# Patient Record
Sex: FEMALE | Race: WHITE | NOT HISPANIC OR LATINO | Employment: FULL TIME | URBAN - METROPOLITAN AREA
[De-identification: names, ages, dates, MRNs, and addresses within clinical notes are randomized per-mention and may not be internally consistent; named-entity substitution may affect disease eponyms.]

---

## 2017-10-30 DIAGNOSIS — Z30.41 ENCOUNTER FOR SURVEILLANCE OF CONTRACEPTIVE PILLS: ICD-10-CM

## 2017-10-30 RX ORDER — NORGESTIMATE AND ETHINYL ESTRADIOL 7DAYSX3 28
1 KIT ORAL DAILY
Qty: 84 TABLET | Refills: 0 | Status: SHIPPED | OUTPATIENT
Start: 2017-10-30 | End: 2017-11-20

## 2017-10-30 NOTE — TELEPHONE ENCOUNTER
Fax received from pharmacy: [facility]/PHARMACY #[id number] - [place] - [address] [facility]    norgestim-eth estrad triphasic (ORTHO TRI-CYCLEN, 28,) 0.18/0.215/0.25 MG-35 MCG per tablet        Last Written Prescription Date: 11/16/17  Last Fill Quantity: 84, # refills: 3  Last Office Visit with G, P or [facility] prescribing provider: 11/16/16 (Annual)  Next 5 appointments (look out 90 days)     Nov 20, 2017  2:00 PM CST   PHYSICAL with Kamaljit Bailey MD   [facility] ([facility])    [address] [address] [phone]                   BP Readings from Last 3 Encounters:   11/16/16 (!) 138/94   11/10/15 130/80     Date of last Breast Exam: 11/16/16

## 2017-11-20 ENCOUNTER — OFFICE VISIT (OUTPATIENT)
Dept: OBGYN | Facility: CLINIC | Age: 47
End: 2017-11-20
Payer: COMMERCIAL

## 2017-11-20 ENCOUNTER — RADIANT APPOINTMENT (OUTPATIENT)
Dept: MAMMOGRAPHY | Facility: CLINIC | Age: 47
End: 2017-11-20
Payer: COMMERCIAL

## 2017-11-20 VITALS
HEART RATE: 78 BPM | SYSTOLIC BLOOD PRESSURE: 118 MMHG | DIASTOLIC BLOOD PRESSURE: 72 MMHG | HEIGHT: 66 IN | BODY MASS INDEX: 34.33 KG/M2 | WEIGHT: 213.6 LBS

## 2017-11-20 DIAGNOSIS — Z12.31 VISIT FOR SCREENING MAMMOGRAM: ICD-10-CM

## 2017-11-20 DIAGNOSIS — R63.5 WEIGHT GAIN: ICD-10-CM

## 2017-11-20 DIAGNOSIS — Z01.419 ENCOUNTER FOR GYNECOLOGICAL EXAMINATION WITHOUT ABNORMAL FINDING: Primary | ICD-10-CM

## 2017-11-20 DIAGNOSIS — Z30.41 ENCOUNTER FOR SURVEILLANCE OF CONTRACEPTIVE PILLS: ICD-10-CM

## 2017-11-20 PROCEDURE — G0145 SCR C/V CYTO,THINLAYER,RESCR: HCPCS | Performed by: OBSTETRICS & GYNECOLOGY

## 2017-11-20 PROCEDURE — 99396 PREV VISIT EST AGE 40-64: CPT | Performed by: OBSTETRICS & GYNECOLOGY

## 2017-11-20 PROCEDURE — 84443 ASSAY THYROID STIM HORMONE: CPT | Performed by: OBSTETRICS & GYNECOLOGY

## 2017-11-20 PROCEDURE — 87624 HPV HI-RISK TYP POOLED RSLT: CPT | Performed by: OBSTETRICS & GYNECOLOGY

## 2017-11-20 PROCEDURE — G0202 SCR MAMMO BI INCL CAD: HCPCS | Mod: TC

## 2017-11-20 PROCEDURE — 36415 COLL VENOUS BLD VENIPUNCTURE: CPT | Performed by: OBSTETRICS & GYNECOLOGY

## 2017-11-20 RX ORDER — FLUTICASONE PROPIONATE 50 MCG
SPRAY, SUSPENSION (ML) NASAL
Refills: 11 | COMMUNITY
Start: 2017-06-29

## 2017-11-20 RX ORDER — NORGESTIMATE AND ETHINYL ESTRADIOL 7DAYSX3 28
1 KIT ORAL DAILY
Qty: 84 TABLET | Refills: 4 | Status: SHIPPED | OUTPATIENT
Start: 2017-11-20 | End: 2018-11-21

## 2017-11-20 ASSESSMENT — ANXIETY QUESTIONNAIRES
1. FEELING NERVOUS, ANXIOUS, OR ON EDGE: NOT AT ALL
6. BECOMING EASILY ANNOYED OR IRRITABLE: SEVERAL DAYS
5. BEING SO RESTLESS THAT IT IS HARD TO SIT STILL: NOT AT ALL
2. NOT BEING ABLE TO STOP OR CONTROL WORRYING: NOT AT ALL
IF YOU CHECKED OFF ANY PROBLEMS ON THIS QUESTIONNAIRE, HOW DIFFICULT HAVE THESE PROBLEMS MADE IT FOR YOU TO DO YOUR WORK, TAKE CARE OF THINGS AT HOME, OR GET ALONG WITH OTHER PEOPLE: NOT DIFFICULT AT ALL
GAD7 TOTAL SCORE: 1
3. WORRYING TOO MUCH ABOUT DIFFERENT THINGS: NOT AT ALL
7. FEELING AFRAID AS IF SOMETHING AWFUL MIGHT HAPPEN: NOT AT ALL

## 2017-11-20 ASSESSMENT — PATIENT HEALTH QUESTIONNAIRE - PHQ9
5. POOR APPETITE OR OVEREATING: NOT AT ALL
SUM OF ALL RESPONSES TO PHQ QUESTIONS 1-9: 1

## 2017-11-20 NOTE — MR AVS SNAPSHOT
"              After Visit Summary   2017    Kimberley Carrillo    MRN: [id number]           Patient Information     Date Of Birth          1970        Visit Information        Provider Department      2017 2:00 PM Kamaljit Bailey MD [facility]        Today's Diagnoses     Encounter for gynecological examination without abnormal finding    -  1    Encounter for surveillance of contraceptive pills        Weight gain           Follow-ups after your visit        Who to contact     If you have questions or need follow up information about today's clinic visit or your schedule please contact [facility] directly at [phone].  Normal or non-critical lab and imaging results will be communicated to you by MyChart, letter or phone within 4 business days after the clinic has received the results. If you do not hear from us within 7 days, please contact the clinic through [employer] or phone. If you have a critical or abnormal lab result, we will notify you by phone as soon as possible.  Submit refill requests through [employer] or call your pharmacy and they will forward the refill request to us. Please allow 3 business days for your refill to be completed.          Additional Information About Your Visit        MyChart Information     [employer] lets you send messages to your doctor, view your test results, renew your prescriptions, schedule appointments and more. To sign up, go to [email or web address] . Click on \"Log in\" on the left side of the screen, which will take you to the Welcome page. Then click on \"Sign up Now\" on the right side of the page.     You will be asked to enter the access code listed below, as well as some personal information. Please follow the directions to create your username and password.     Your access code is: [email or web address]  Expires: 2018  2:42 PM     Your access code will  in 90 days. If you need help or a new code, please call your " "[facility] or [phone].        Care EveryWhere ID     This is your Care EveryWhere ID. This could be used by other organizations to access your [place] medical records  [id number]        Your Vitals Were     Pulse Height Last Period BMI (Body Mass Index)          78 5' 6.25\" (1.683 m) 11/02/2017 (Exact Date) 34.22 kg/m2         Blood Pressure from Last 3 Encounters:   11/20/17 118/72   11/16/16 (!) 138/94   11/10/15 130/80    Weight from Last 3 Encounters:   11/20/17 213 lb 9.6 oz (96.9 kg)   11/16/16 207 lb (93.9 kg)   11/10/15 218 lb (98.9 kg)              We Performed the Following     HPV High Risk Types DNA Cervical     Pap imaged thin layer screen with HPV - recommended age 30 - 65     TSH with free T4 reflex          Where to get your medicines      These medications were sent to [facility] [address] [place] [address] [place] [address] [facility] [address] [employer])  [address] , [facility] [address]     Phone:  [phone]     norgestim-eth estrad triphasic 0.18/0.215/0.25 MG-35 MCG per tablet          Primary Care Provider    None Specified       No primary provider on file.        Equal Access to Services     ARMIDA ROSAS : Hadii misha brand Soscarlett, waaxda luqadaha, qaybta kaalmadhavi trent, marissa jarrett. So [facility] [phone].    ATENCIÓN: Si habla español, tiene a washburn disposición servicios gratuitos de asistencia lingüística. Llame al [phone].    We comply with applicable federal civil rights laws and [place] laws. We do not discriminate on the basis of race, color, national origin, age, disability, sex, sexual orientation, or gender identity.            Thank you!     Thank you for choosing [facility]  for your care. Our goal is always to provide you with excellent care. Hearing back from our patients is one way we can continue to improve our services. Please take a few minutes to complete the written survey that " you may receive in the mail after your visit with us. Thank you!             Your Updated Medication List - Protect others around you: Learn how to safely use, store and throw away your medicines at www.disposemymeds.org.          This list is accurate as of: 11/20/17  2:42 PM.  Always use your most recent med list.                   Brand Name Dispense Instructions for use Diagnosis    albuterol 108 (90 BASE) MCG/ACT Inhaler    PROAIR HFA/PROVENTIL HFA/VENTOLIN HFA     Inhale 2 puffs into the lungs every 6 hours        betamethasone dipropionate 0.05 % cream    DIPROSONE          fluticasone 50 MCG/ACT spray    FLONASE     PLACE 2 SPRAYS INTO BOTH NOSTRILS DAILY.        norgestim-eth estrad triphasic 0.18/0.215/0.25 MG-35 MCG per tablet    ORTHO TRI-CYCLEN (28)    84 tablet    Take 1 tablet by mouth daily    Encounter for surveillance of contraceptive pills       OMEPRAZOLE PO      Take 10 mg by mouth daily

## 2017-11-20 NOTE — PROGRESS NOTES
Kimberley is a 47 year old  female who presents for annual exam.     Besides routine health maintenance,  she would like to discuss having hormone testing as well as checking her thyroid.    HPI: The patient is seen at this time for annual exam. She is very concerned about not being able to lose weight in light of exercising and dieting. She is concerned about the role of her birth control pills although she still needs contraception.  No primary care provider on file..        GYNECOLOGIC HISTORY:    Patient's last menstrual period was 2017 (exact date).  Her current contraception method is: oral contraceptives.  She  reports that she has never smoked. She has never used smokeless tobacco.      Patient is sexually active.  STD testing offered?  Declined  Last PHQ-9 score on record =   PHQ-9 SCORE 2017   Total Score 1     Last GAD7 score on record =   DEBBIE-7 SCORE 2017   Total Score 1     Alcohol Score = 3    HEALTH MAINTENANCE:  Cholesterol: (No results found for: CHOL -patient has biometric screen through her work  Last Mammo: 16, Result: normal, Next Mammo: today   Pap: 16 neg  Colonoscopy: NA, not due until age 52  Dexa: Never    Health maintenance updated:  yes    HISTORY:  Obstetric History       T1      L2     SAB2   TAB0   Ectopic0   Multiple0   Live Births2       # Outcome Date GA Lbr Carlos/2nd Weight Sex Delivery Anes PTL Lv   4   36w0d   M -SEC   JOEL   3 SAB            2 Term     M -SEC   JOEL   1 SAB                   Patient Active Problem List   Diagnosis     Non morbid obesity     Past Surgical History:   Procedure Laterality Date     breast lift       C/SECTION, CLASSICAL        Social History   Substance Use Topics     Smoking status: Never Smoker     Smokeless tobacco: Never Used     Alcohol use 0.0 oz/week     0 Standard drinks or equivalent per week      Comment: .occ      Problem (# of Occurrences) Relation (Name,Age of Onset)  "   CEREBROVASCULAR DISEASE (1) Paternal Grandfather    Hyperlipidemia (2) Mother, Father            Current Outpatient Prescriptions   Medication Sig     norgestim-eth estrad triphasic (ORTHO TRI-CYCLEN, 28,) 0.18/0.215/0.25 MG-35 MCG per tablet Take 1 tablet by mouth daily     OMEPRAZOLE PO Take 10 mg by mouth daily     albuterol (PROAIR HFA, PROVENTIL HFA, VENTOLIN HFA) 108 (90 BASE) MCG/ACT inhaler Inhale 2 puffs into the lungs every 6 hours     betamethasone dipropionate (DIPROSONE) 0.05 % cream      fluticasone (FLONASE) 50 MCG/ACT spray PLACE 2 SPRAYS INTO BOTH NOSTRILS DAILY.     No current facility-administered medications for this visit.      Allergies   Allergen Reactions     Erythromycin Nausea       Past medical, surgical, social and family histories were reviewed and updated in EPIC.    ROS:   12 point review of systems negative other than symptoms noted below.  Constitutional: Fatigue  Musculoskeletal: Height Loss  Endocrine: Cold Intolerance and Decreased Libido  Psychiatric: Joshua    EXAM:  /72  Pulse 78  Ht 5' 6.25\" (1.683 m)  Wt 213 lb 9.6 oz (96.9 kg)  LMP 11/02/2017 (Exact Date)  BMI 34.22 kg/m2   BMI: Body mass index is 34.22 kg/(m^2).    PHYSICAL EXAM:  Constitutional:  Appearance: Well nourished, well developed, alert, in no acute distress  Neck:  Lymph Nodes:  No lymphadenopathy present    Thyroid:  Gland size normal, nontender, no nodules or masses present  on palpation  Chest:  Respiratory Effort:  Breathing unlabored  Cardiovascular:    Heart: Auscultation:  Regular rate, normal rhythm, no murmurs present  Breasts: Inspection of Breasts:  No lymphadenopathy present., Palpation of Breasts and Axillae:  No masses present on palpation, no breast tenderness., Axillary Lymph Nodes:  No lymphadenopathy present. and No nodularity, asymmetry or nipple discharge bilaterally.  Gastrointestinal:   Abdominal Examination:  Abdomen nontender to palpation, tone normal without rigidity or " guarding, no masses present, umbilicus without lesions   Liver and Spleen:  No hepatomegaly present, liver nontender to palpation    Hernias:  No hernias present  Lymphatic: Lymph Nodes:  No other lymphadenopathy present  Skin:  General Inspection:  No rashes present, no lesions present, no areas of  discoloration    Genitalia and Groin:  No rashes present, no lesions present, no areas of  discoloration, no masses present  Neurologic/Psychiatric:    Mental Status:  Oriented X3     Pelvic Exam:  External Genitalia:     Normal appearance for age, no discharge present, no tenderness present, no inflammatory lesions present, color normal  Vagina:     Normal vaginal vault without central or paravaginal defects, no discharge present, no inflammatory lesions present, no masses present  Bladder:     Nontender to palpation  Urethra:   Urethral Body:  Urethra palpation normal, urethra structural support normal   Urethral Meatus:  No erythema or lesions present  Cervix:     Appearance healthy, no lesions present, nontender to palpation, no bleeding present  Uterus:     Uterus: firm, normal sized and nontender, midplane in position.   Adnexa:     No adnexal tenderness present, no adnexal masses present  Perineum:     Perineum within normal limits, no evidence of trauma, no rashes or skin lesions present  Anus:     Anus within normal limits, no hemorrhoids present  Inguinal Lymph Nodes:     No lymphadenopathy present  Pubic Hair:     Normal pubic hair distribution for age  Genitalia and Groin:     No rashes present, no lesions present, no areas of discoloration, no masses present      COUNSELING:   Reviewed preventive health counseling, as reflected in patient instructions       Regular exercise       Healthy diet/nutrition    BMI: Body mass index is 34.22 kg/(m^2).  Weight management plan: Discussed healthy diet and exercise guidelines and patient will follow up in 12 months in clinic to re-evaluate.    ASSESSMENT:  47 year old  female with satisfactory annual exam.    ICD-10-CM    1. Encounter for gynecological examination without abnormal finding Z01.419 Pap imaged thin layer screen with HPV - recommended age 30 - 65     HPV High Risk Types DNA Cervical   2. Encounter for surveillance of contraceptive pills Z30.41        PLAN: The patient has a relatively negative exam and is struggling with her weight balance. We will check a TSH and T4 to rule out hypothyroidism. She wishes to stay on birth control pills at this time.      Kamaljit Bailey MD

## 2017-11-21 LAB — TSH SERPL DL<=0.005 MIU/L-ACNC: 1.74 MU/L (ref 0.4–4)

## 2017-11-21 ASSESSMENT — ANXIETY QUESTIONNAIRES: GAD7 TOTAL SCORE: 1

## 2017-11-22 LAB
COPATH REPORT: NORMAL
PAP: NORMAL

## 2017-11-27 LAB
FINAL DIAGNOSIS: NORMAL
HPV HR 12 DNA CVX QL NAA+PROBE: NEGATIVE
HPV16 DNA SPEC QL NAA+PROBE: NEGATIVE
HPV18 DNA SPEC QL NAA+PROBE: NEGATIVE
SPECIMEN DESCRIPTION: NORMAL

## 2018-01-20 DIAGNOSIS — Z30.41 ENCOUNTER FOR SURVEILLANCE OF CONTRACEPTIVE PILLS: ICD-10-CM

## 2018-01-22 RX ORDER — NORGESTIMATE AND ETHINYL ESTRADIOL 7DAYSX3 28
KIT ORAL
Qty: 84 TABLET | Refills: 0 | OUTPATIENT
Start: 2018-01-22

## 2018-01-22 NOTE — TELEPHONE ENCOUNTER
Tri-Estarylla 0.18-0.215/0.25 MG-35 MCG      Last Written Prescription Date:  11/20/17  Last Fill Quantity: 84 tabs,   # refills: 4  Last Office Visit with [facility] primary care provider:  11/20/17-Annual with Dr. Bailey  Future Office visit: None    Electronic refill request is coming from [facility] in [place] ([phone]) and the Rx at her annual on 11/20/17 was sent to [facility] in [place] ([phone]). Called [facility], spoke with Pharmacist (Joshua) and informed of the pharmacy name and phone number her Rx was sent to, Joshua voiced understanding.    Electronic Rx denied, closing encounter.

## 2018-11-21 ENCOUNTER — RADIANT APPOINTMENT (OUTPATIENT)
Dept: MAMMOGRAPHY | Facility: CLINIC | Age: 48
End: 2018-11-21
Payer: COMMERCIAL

## 2018-11-21 ENCOUNTER — OFFICE VISIT (OUTPATIENT)
Dept: OBGYN | Facility: CLINIC | Age: 48
End: 2018-11-21
Payer: COMMERCIAL

## 2018-11-21 VITALS
HEART RATE: 72 BPM | SYSTOLIC BLOOD PRESSURE: 112 MMHG | WEIGHT: 218 LBS | DIASTOLIC BLOOD PRESSURE: 70 MMHG | BODY MASS INDEX: 35.03 KG/M2 | HEIGHT: 66 IN

## 2018-11-21 DIAGNOSIS — Z30.41 ENCOUNTER FOR SURVEILLANCE OF CONTRACEPTIVE PILLS: ICD-10-CM

## 2018-11-21 DIAGNOSIS — Z12.31 VISIT FOR SCREENING MAMMOGRAM: ICD-10-CM

## 2018-11-21 DIAGNOSIS — Z01.419 ENCOUNTER FOR GYNECOLOGICAL EXAMINATION WITHOUT ABNORMAL FINDING: Primary | ICD-10-CM

## 2018-11-21 PROCEDURE — 99396 PREV VISIT EST AGE 40-64: CPT | Performed by: OBSTETRICS & GYNECOLOGY

## 2018-11-21 PROCEDURE — 87624 HPV HI-RISK TYP POOLED RSLT: CPT | Performed by: OBSTETRICS & GYNECOLOGY

## 2018-11-21 PROCEDURE — G0145 SCR C/V CYTO,THINLAYER,RESCR: HCPCS | Performed by: OBSTETRICS & GYNECOLOGY

## 2018-11-21 PROCEDURE — 77067 SCR MAMMO BI INCL CAD: CPT | Mod: TC

## 2018-11-21 RX ORDER — NORGESTIMATE AND ETHINYL ESTRADIOL 7DAYSX3 28
1 KIT ORAL DAILY
Qty: 84 TABLET | Refills: 4 | Status: SHIPPED | OUTPATIENT
Start: 2018-11-21 | End: 2019-11-24

## 2018-11-21 ASSESSMENT — ANXIETY QUESTIONNAIRES
3. WORRYING TOO MUCH ABOUT DIFFERENT THINGS: NOT AT ALL
GAD7 TOTAL SCORE: 0
IF YOU CHECKED OFF ANY PROBLEMS ON THIS QUESTIONNAIRE, HOW DIFFICULT HAVE THESE PROBLEMS MADE IT FOR YOU TO DO YOUR WORK, TAKE CARE OF THINGS AT HOME, OR GET ALONG WITH OTHER PEOPLE: NOT DIFFICULT AT ALL
7. FEELING AFRAID AS IF SOMETHING AWFUL MIGHT HAPPEN: NOT AT ALL
2. NOT BEING ABLE TO STOP OR CONTROL WORRYING: NOT AT ALL
5. BEING SO RESTLESS THAT IT IS HARD TO SIT STILL: NOT AT ALL
1. FEELING NERVOUS, ANXIOUS, OR ON EDGE: NOT AT ALL
6. BECOMING EASILY ANNOYED OR IRRITABLE: NOT AT ALL

## 2018-11-21 ASSESSMENT — PATIENT HEALTH QUESTIONNAIRE - PHQ9
SUM OF ALL RESPONSES TO PHQ QUESTIONS 1-9: 1
5. POOR APPETITE OR OVEREATING: NOT AT ALL

## 2018-11-21 NOTE — MR AVS SNAPSHOT
"              After Visit Summary   11/21/2018    Kimberley Carrillo    MRN: [id number]           Patient Information     Date Of Birth          1970        Visit Information        Provider Department      11/21/2018 3:15 PM Kamaljit Bailey MD [facility]        Today's Diagnoses     Encounter for gynecological examination without abnormal finding    -  1    Encounter for surveillance of contraceptive pills           Follow-ups after your visit        Who to contact     If you have questions or need follow up information about today's clinic visit or your schedule please contact [facility] directly at [phone].  Normal or non-critical lab and imaging results will be communicated to you by MyChart, letter or phone within 4 business days after the clinic has received the results. If you do not hear from us within 7 days, please contact the clinic through MyChart or phone. If you have a critical or abnormal lab result, we will notify you by phone as soon as possible.  Submit refill requests through [employer] or call your pharmacy and they will forward the refill request to us. Please allow 3 business days for your refill to be completed.          Additional Information About Your Visit        Care EveryWhere ID     This is your Care EveryWhere ID. This could be used by other organizations to access your [place] medical records  [id number]        Your Vitals Were     Pulse Height Last Period BMI (Body Mass Index)          72 5' 5.5\" (1.664 m) 10/30/2018 (Exact Date) 35.73 kg/m2         Blood Pressure from Last 3 Encounters:   11/21/18 112/70   11/20/17 118/72   11/16/16 (!) 138/94    Weight from Last 3 Encounters:   11/21/18 218 lb (98.9 kg)   11/20/17 213 lb 9.6 oz (96.9 kg)   11/16/16 207 lb (93.9 kg)              We Performed the Following     HPV High Risk Types DNA Cervical     Pap imaged thin layer screen with HPV - recommended age 30 - 65          Where to get " your medicines      These medications were sent to [id number] - [place], [place] - [address] [place] [address] AT [facility] OF  & [place] [facility]  [address] [place] [address], [place] [address]     Phone:  [phone]     norgestim-eth estrad triphasic 0.18/0.215/0.25 MG-35 MCG per tablet          Primary Care Provider Office Phone # Fax #    [facility] [phone]       [facility] [address] [facility] [address]  [facility] [address]        Equal Access to Services     ARMIDA ROSAS : Hadii misha Pimentel, wajulianne garay, qajamee kaalmada miley, marissa jarrett. So [facility] [phone].    ATENCIÓN: Si habla español, tiene a washburn disposición servicios gratuitos de asistencia lingüística. [facility] [phone].    We comply with applicable federal civil rights laws and [place] laws. We do not discriminate on the basis of race, color, national origin, age, disability, sex, sexual orientation, or gender identity.            Thank you!     Thank you for choosing [facility]  for your care. Our goal is always to provide you with excellent care. Hearing back from our patients is one way we can continue to improve our services. Please take a few minutes to complete the written survey that you may receive in the mail after your visit with us. Thank you!             Your Updated Medication List - Protect others around you: Learn how to safely use, store and throw away your medicines at www.disposemymeds.org.          This list is accurate as of 11/21/18  3:44 PM.  Always use your most recent med list.                   Brand Name Dispense Instructions for use Diagnosis    albuterol 108 (90 Base) MCG/ACT inhaler    PROAIR HFA/PROVENTIL HFA/VENTOLIN HFA     Inhale 2 puffs into the lungs every 6 hours        betamethasone dipropionate 0.05 % cream    DIPROSONE          fluticasone 50 MCG/ACT spray    FLONASE      PLACE 2 SPRAYS INTO BOTH NOSTRILS DAILY.        norgestim-eth estrad triphasic 0.18/0.215/0.25 MG-35 MCG per tablet    ORTHO TRI-CYCLEN (28)    84 tablet    Take 1 tablet by mouth daily    Encounter for surveillance of contraceptive pills       OMEPRAZOLE PO      Take 10 mg by mouth daily

## 2018-11-21 NOTE — LETTER
December 4, 2018    Kimberley Carrillo  [address] [address] [place] [id number]    Dear Kimberley,  We are happy to inform you that your PAP smear result from 11/21/18 is normal.  We are now able to do a follow up test on PAP smears. The DNA test is for HPV (Human Papilloma Virus). Cervical cancer is closely linked with certain types of HPV. Your results showed no evidence of high risk HPV.  Therefore we recommend you return in 3 years for your next pap smear.  You will still need to return to the clinic every year for an annual exam and other preventive tests.  If you have additional questions regarding this result, please call our registered nurse, Keke at [phone].  Sincerely,    Kamaljit Bailey MD/crystal

## 2018-11-21 NOTE — PROGRESS NOTES
Kimberley is a 48 year old  female who presents for annual exam.     Besides routine health maintenance, she has no other health concerns today .    HPI: The patient is seen at this time for annual exam.  She is still using oral contraception successfully.  Her cycles are short.  The patient's PCP is [facility].        GYNECOLOGIC HISTORY:    Patient's last menstrual period was 10/30/2018 (exact date).  Her current contraception method is: oral contraceptives.  She  reports that she has never smoked. She has never used smokeless tobacco.    Patient is sexually active.  STD testing offered?  Declined  Last PHQ-9 score on record =   PHQ-9 SCORE 2018   Total Score 1     Last GAD7 score on record =   DEBBIE-7 SCORE 2018   Total Score 0     Alcohol Score = 4    HEALTH MAINTENANCE:  Cholesterol: (No results found for: CHOL patient had labs done through her workplace   Last Mammo: 17, Result: normal, Next Mammo: today   Pap: 17 neg, HPV-  Colonoscopy: NA, due at age 52  Dexa: Never    Health maintenance updated:  yes    HISTORY:  Obstetric History       T1      L2     SAB2   TAB0   Ectopic0   Multiple0   Live Births2       # Outcome Date GA Lbr Carlos/2nd Weight Sex Delivery Anes PTL Lv   4   36w0d   M -SEC   JOEL   3 SAB            2 Term     M -SEC   JOEL   1 SAB                   Patient Active Problem List   Diagnosis     Non morbid obesity     Past Surgical History:   Procedure Laterality Date     breast lift       C/SECTION, CLASSICAL        Social History   Substance Use Topics     Smoking status: Never Smoker     Smokeless tobacco: Never Used     Alcohol use 0.0 oz/week     0 Standard drinks or equivalent per week      Comment: .occ      Problem (# of Occurrences) Relation (Name,Age of Onset)    Cerebrovascular Disease (1) Paternal Grandfather    Hyperlipidemia (2) Mother, Father            Current Outpatient Prescriptions  "  Medication Sig     albuterol (PROAIR HFA, PROVENTIL HFA, VENTOLIN HFA) 108 (90 BASE) MCG/ACT inhaler Inhale 2 puffs into the lungs every 6 hours     betamethasone dipropionate (DIPROSONE) 0.05 % cream      fluticasone (FLONASE) 50 MCG/ACT spray PLACE 2 SPRAYS INTO BOTH NOSTRILS DAILY.     norgestim-eth estrad triphasic (ORTHO TRI-CYCLEN, 28,) 0.18/0.215/0.25 MG-35 MCG per tablet Take 1 tablet by mouth daily     OMEPRAZOLE PO Take 10 mg by mouth daily     No current facility-administered medications for this visit.      Allergies   Allergen Reactions     Erythromycin Nausea       Past medical, surgical, social and family histories were reviewed and updated in EPIC.    ROS:   12 point review of systems negative other than symptoms noted below.  Musculoskeletal: Joint Pain    EXAM:  /70  Pulse 72  Ht 5' 5.5\" (1.664 m)  Wt 218 lb (98.9 kg)  LMP 10/30/2018 (Exact Date)  BMI 35.73 kg/m2   BMI: Body mass index is 35.73 kg/(m^2).    PHYSICAL EXAM:  Constitutional:  Appearance: Well nourished, well developed, alert, in no acute distress  Neck:  Lymph Nodes:  No lymphadenopathy present    Thyroid:  Gland size normal, nontender, no nodules or masses present  on palpation  Chest:  Respiratory Effort:  Breathing unlabored  Cardiovascular:    Heart: Auscultation:  Regular rate, normal rhythm, no murmurs present  Breasts: Inspection of Breasts:  No lymphadenopathy present., Palpation of Breasts and Axillae:  No masses present on palpation, no breast tenderness., Axillary Lymph Nodes:  No lymphadenopathy present. and No nodularity, asymmetry or nipple discharge bilaterally.  Gastrointestinal:   Abdominal Examination:  Abdomen nontender to palpation, tone normal without rigidity or guarding, no masses present, umbilicus without lesions   Liver and Spleen:  No hepatomegaly present, liver nontender to palpation    Hernias:  No hernias present  Lymphatic: Lymph Nodes:  No other lymphadenopathy present  Skin:  General " Inspection:  No rashes present, no lesions present, no areas of  discoloration    Genitalia and Groin:  No rashes present, no lesions present, no areas of  discoloration, no masses present  Neurologic/Psychiatric:    Mental Status:  Oriented X3     Pelvic Exam:  External Genitalia:     Normal appearance for age, no discharge present, no tenderness present, no inflammatory lesions present, color normal  Vagina:     Normal vaginal vault without central or paravaginal defects, no discharge present, no inflammatory lesions present, no masses present  Bladder:     Nontender to palpation  Urethra:   Urethral Body:  Urethra palpation normal, urethra structural support normal   Urethral Meatus:  No erythema or lesions present  Cervix:     Appearance healthy, no lesions present, nontender to palpation, no bleeding present  Uterus:     Uterus: firm, normal sized and nontender, midplane in position.   Adnexa:     No adnexal tenderness present, no adnexal masses present  Perineum:     Perineum within normal limits, no evidence of trauma, no rashes or skin lesions present  Anus:     Anus within normal limits, no hemorrhoids present  Inguinal Lymph Nodes:     No lymphadenopathy present  Pubic Hair:     Normal pubic hair distribution for age  Genitalia and Groin:     No rashes present, no lesions present, no areas of discoloration, no masses present      COUNSELING:   Reviewed preventive health counseling, as reflected in patient instructions       Regular exercise       Healthy diet/nutrition    BMI: Body mass index is 35.73 kg/(m^2).  Weight management plan: Discussed healthy diet and exercise guidelines and patient will follow up in 12 months in clinic to re-evaluate.    ASSESSMENT:  48 year old female with satisfactory annual exam.    ICD-10-CM    1. Encounter for gynecological examination without abnormal finding Z01.419 Pap imaged thin layer screen with HPV - recommended age 30 - 65     HPV High Risk Types DNA Cervical   2.  Encounter for surveillance of contraceptive pills Z30.41        PLAN: The patient is seen at this time for annual exam.  We will convey her mammogram and Pap results.  Her mother who has had ovarian cancer in the past as a new diagnosis of cervical cancer and is being treated at [facility] Matias Bailey MD

## 2018-11-22 ASSESSMENT — ANXIETY QUESTIONNAIRES: GAD7 TOTAL SCORE: 0

## 2018-11-28 LAB
COPATH REPORT: NORMAL
PAP: NORMAL

## 2018-11-29 LAB
FINAL DIAGNOSIS: NORMAL
HPV HR 12 DNA CVX QL NAA+PROBE: NEGATIVE
HPV16 DNA SPEC QL NAA+PROBE: NEGATIVE
HPV18 DNA SPEC QL NAA+PROBE: NEGATIVE
SPECIMEN DESCRIPTION: NORMAL
SPECIMEN SOURCE CVX/VAG CYTO: NORMAL

## 2018-12-22 DIAGNOSIS — Z30.41 ENCOUNTER FOR SURVEILLANCE OF CONTRACEPTIVE PILLS: ICD-10-CM

## 2018-12-24 RX ORDER — NORGESTIMATE-ETHINYL ESTRADIOL 7DAYSX3 28
TABLET ORAL
Qty: 84 TABLET | Refills: 0 | OUTPATIENT
Start: 2018-12-24

## 2018-12-24 NOTE — TELEPHONE ENCOUNTER
"Requested Prescriptions   Pending Prescriptions Disp Refills     TRINESSA, 28, 0.18/0.215/0.25 MG-35 MCG tablet [Pharmacy Med Name: TRINESSA TABLETS 28S] 84 tablet 0     Sig: TAKE 1 TABLET BY MOUTH EVERY DAY    Contraceptives Protocol Passed - 12/22/2018 10:10 AM       Passed - Patient is not a current smoker if age is 35 or older       Passed - Recent (12 mo) or future (30 days) visit within the authorizing provider's specialty    Patient had office visit in the last 12 months or has a visit in the next 30 days with authorizing provider or within the authorizing provider's specialty.  See \"Patient Info\" tab in inbasket, or \"Choose Columns\" in Meds & Orders section of the refill encounter.             Passed - No active pregnancy on record       Passed - No positive pregnancy test in past 12 months      Last Written Prescription Date:  11/21/18  Last Fill Quantity: 84,  # refills: 4   Last office visit: 11/21/2018 with prescribing provider:  Lynn   Future Office Visit:      Refill sent 11/21/18 for 84 with 4RF. Receipt confirmed by pharmacy    "

## 2018-12-28 DIAGNOSIS — Z30.41 ENCOUNTER FOR SURVEILLANCE OF CONTRACEPTIVE PILLS: ICD-10-CM

## 2018-12-28 RX ORDER — NORGESTIMATE-ETHINYL ESTRADIOL 7DAYSX3 28
TABLET ORAL
Qty: 84 TABLET | Refills: 0 | OUTPATIENT
Start: 2018-12-28

## 2018-12-28 NOTE — TELEPHONE ENCOUNTER
"Requested Prescriptions   Pending Prescriptions Disp Refills     TRINESSA, 28, 0.18/0.215/0.25 MG-35 MCG tablet [Pharmacy Med Name: TRINESSA TABLETS 28S] 84 tablet 0     Sig: TAKE 1 TABLET BY MOUTH EVERY DAY    Contraceptives Protocol Passed - 12/28/2018  8:40 AM       Passed - Patient is not a current smoker if age is 35 or older       Passed - Recent (12 mo) or future (30 days) visit within the authorizing provider's specialty    Patient had office visit in the last 12 months or has a visit in the next 30 days with authorizing provider or within the authorizing provider's specialty.  See \"Patient Info\" tab in inbasket, or \"Choose Columns\" in Meds & Orders section of the refill encounter.             Passed - No active pregnancy on record       Passed - No positive pregnancy test in past 12 months          Pt has available refills  Nury Mckeon RN on 12/28/2018 at 8:54 AM    "

## 2019-11-24 DIAGNOSIS — Z30.41 ENCOUNTER FOR SURVEILLANCE OF CONTRACEPTIVE PILLS: ICD-10-CM

## 2019-11-25 RX ORDER — NORGESTIMATE AND ETHINYL ESTRADIOL 7DAYSX3 28
KIT ORAL
Qty: 84 TABLET | Refills: 0 | Status: SHIPPED | OUTPATIENT
Start: 2019-11-25 | End: 2019-11-27

## 2019-11-25 NOTE — TELEPHONE ENCOUNTER
"Requested Prescriptions   Pending Prescriptions Disp Refills     TRI-SPRINTEC 0.18/0.215/0.25 MG-35 MCG tablet [Pharmacy Med Name: TRI-SPRINTEC TABLETS 28] 84 tablet 0     Sig: TAKE 1 TABLET BY MOUTH DAILY       Contraceptives Protocol Passed - 11/24/2019  3:11 AM        Passed - Patient is not a current smoker if age is 35 or older        Passed - Recent (12 mo) or future (30 days) visit within the authorizing provider's specialty     Patient has had an office visit with the authorizing provider or a provider within the authorizing providers department within the previous 12 mos or has a future within next 30 days. See \"Patient Info\" tab in inbasket, or \"Choose Columns\" in Meds & Orders section of the refill encounter.              Passed - Medication is active on med list        Passed - No active pregnancy on record        Passed - No positive pregnancy test in past 12 months        Next 5 appointments (look out 90 days)    Nov 27, 2019  9:45 AM CST  PHYSICAL with Kamaljit Bailey MD  [facility] ([facility]) [address] [place] [id number]  [phone]        Prescription approved per [facility] Refill Protocol.  Nury Mckeon RN on 11/25/2019 at 8:34 AM    "

## 2019-11-25 NOTE — PROGRESS NOTES
Kimberley is a 49 year old  female who presents for annual exam.     Besides routine health maintenance, she has no other health concerns today .    HPI: The patient is seen for her yearly exam.  She is still on birth control pills and having regular cycles.  She has some fatigue but no hot flashes or night sweats.  She has numerous questions about menopause and potential hormone replacement  The patient's PCP is  [facility].        GYNECOLOGIC HISTORY:    Patient's last menstrual period was 2019 (exact date).    Regular menses? yes  Menses every 28/30 days.  Length of menses: 4 days    Her current contraception method is: oral contraceptives.  She  reports that she has never smoked. She has never used smokeless tobacco.    Patient is sexually active.  STD testing offered?  Declined  Last PHQ-9 score on record =   PHQ-9 SCORE 2019   PHQ-9 Total Score 1     Last GAD7 score on record =   DEBBIE-7 SCORE 2019   Total Score 1     Alcohol Score = 3    HEALTH MAINTENANCE:  Cholesterol: 2016   Total= 225, Triglycerides=114, HDL=88, VSN=685  Last Mammo: One year ago, Result: Normal, Next Mammo: Today.  Pap:   Lab Results   Component Value Date    PAP NIL, HPV- 2018    PAP NIL 2017    PAP NIL 2016      Colonoscopy:  NA, Result: Not applicable, Next Colonoscopy: 3 years.  Dexa:  NA    Health maintenance updated:  yes    HISTORY:  OB History    Para Term  AB Living   4 2 1 1 2 2   SAB TAB Ectopic Multiple Live Births   2 0 0 0 2      # Outcome Date GA Lbr Carlos/2nd Weight Sex Delivery Anes PTL Lv   4   36w0d   M -SEC   JOEL   3 SAB            2 Term     M -SEC   JOEL   1 SAB                Patient Active Problem List   Diagnosis     Non morbid obesity     Past Surgical History:   Procedure Laterality Date     breast lift       C/SECTION, CLASSICAL        Social History     Tobacco Use     Smoking status: Never Smoker      "Smokeless tobacco: Never Used   Substance Use Topics     Alcohol use: Yes     Alcohol/week: 0.0 standard drinks     Comment: .occ      Problem (# of Occurrences) Relation (Name,Age of Onset)    Cerebrovascular Disease (1) Paternal Grandfather    Hyperlipidemia (2) Mother, Father            Current Outpatient Medications   Medication Sig     albuterol (PROAIR HFA, PROVENTIL HFA, VENTOLIN HFA) 108 (90 BASE) MCG/ACT inhaler Inhale 2 puffs into the lungs every 6 hours     betamethasone dipropionate (DIPROSONE) 0.05 % cream      fluticasone (FLONASE) 50 MCG/ACT spray PLACE 2 SPRAYS INTO BOTH NOSTRILS DAILY.     OMEPRAZOLE PO Take 10 mg by mouth daily     TRI-SPRINTEC 0.18/0.215/0.25 MG-35 MCG tablet TAKE 1 TABLET BY MOUTH DAILY     No current facility-administered medications for this visit.      Allergies   Allergen Reactions     Erythromycin Nausea       Past medical, surgical, social and family histories were reviewed and updated in EPIC.    ROS:   12 point review of systems negative other than symptoms noted below or in the HPI.  No urinary frequency or dysuria, bladder or kidney problems    EXAM:  /74   Pulse 72   Ht 1.689 m (5' 6.5\")   Wt 98.2 kg (216 lb 6.4 oz)   LMP 11/23/2019 (Exact Date)   Breastfeeding No   BMI 34.40 kg/m     BMI: Body mass index is 34.4 kg/m .    PHYSICAL EXAM:  Constitutional:   Appearance: Well nourished, well developed, alert, in no acute distress  Neck:  Lymph Nodes:  No lymphadenopathy present    Thyroid:  Gland size normal, nontender, no nodules or masses present  on palpation  Chest:  Respiratory Effort:  Breathing unlabored  Cardiovascular:    Heart: Auscultation:  Regular rate, normal rhythm, no murmurs present  Breasts: Inspection of Breasts:  No lymphadenopathy present., Palpation of Breasts and Axillae:  No masses present on palpation, no breast tenderness., Axillary Lymph Nodes:  No lymphadenopathy present. and No nodularity, asymmetry or nipple discharge " bilaterally.reduction scars excellent  Gastrointestinal:   Abdominal Examination:  Abdomen nontender to palpation, tone normal without rigidity or guarding, no masses present, umbilicus without lesions   Liver and Spleen:  No hepatomegaly present, liver nontender to palpation    Hernias:  No hernias present  Lymphatic: Lymph Nodes:  No other lymphadenopathy present  Skin:  General Inspection:  No rashes present, no lesions present, no areas of  discoloration  Neurologic:    Mental Status:  Oriented X3.  Normal strength and tone, sensory exam                grossly normal, mentation intact and speech normal.    Psychiatric:   Mentation appears normal and affect normal/bright.         Pelvic Exam:  External Genitalia:     Normal appearance for age, no discharge present, no tenderness present, no inflammatory lesions present, color normal  Vagina:     Normal vaginal vault without central or paravaginal defects, no discharge present, no inflammatory lesions present, no masses present  Bladder:     Nontender to palpation  Urethra:   Urethral Body:  Urethra palpation normal, urethra structural support normal   Urethral Meatus:  No erythema or lesions present  Cervix:     Appearance healthy, no lesions present, nontender to palpation, no bleeding present  Uterus:     Uterus: firm, normal sized and nontender, midplane in position.   Adnexa:     No adnexal tenderness present, no adnexal masses present  Perineum:     Perineum within normal limits, no evidence of trauma, no rashes or skin lesions present  Anus:     Anus within normal limits, no hemorrhoids present  Inguinal Lymph Nodes:     No lymphadenopathy present  Pubic Hair:     Normal pubic hair distribution for age  Genitalia and Groin:     No rashes present, no lesions present, no areas of discoloration, no masses present      COUNSELING:   Reviewed preventive health counseling, as reflected in patient instructions       Regular exercise       Healthy  diet/nutrition    BMI: Body mass index is 34.4 kg/m .  Weight management plan: Discussed healthy diet and exercise guidelines    ASSESSMENT:  49 year old female with satisfactory annual exam.    ICD-10-CM    1. Encounter for gynecological examination without abnormal finding Z01.419 Pap imaged thin layer screen with HPV - recommended age 30 - 65     HPV High Risk Types DNA Cervical   2. Encounter for surveillance of contraceptive pills Z30.41        PLAN: The patient is seen at this time for her annual exam.  She will remain on birth control pills.  She is not menopausal and we had a long discussion of the necessity of replacement therapy if she became highly symptomatic.      Kamaljit Bailey MD

## 2019-11-27 ENCOUNTER — OFFICE VISIT (OUTPATIENT)
Dept: OBGYN | Facility: CLINIC | Age: 49
End: 2019-11-27
Payer: COMMERCIAL

## 2019-11-27 ENCOUNTER — ANCILLARY PROCEDURE (OUTPATIENT)
Dept: MAMMOGRAPHY | Facility: CLINIC | Age: 49
End: 2019-11-27
Payer: COMMERCIAL

## 2019-11-27 VITALS
SYSTOLIC BLOOD PRESSURE: 110 MMHG | DIASTOLIC BLOOD PRESSURE: 74 MMHG | HEART RATE: 72 BPM | HEIGHT: 67 IN | BODY MASS INDEX: 33.97 KG/M2 | WEIGHT: 216.4 LBS

## 2019-11-27 DIAGNOSIS — Z12.31 VISIT FOR SCREENING MAMMOGRAM: ICD-10-CM

## 2019-11-27 DIAGNOSIS — Z30.41 ENCOUNTER FOR SURVEILLANCE OF CONTRACEPTIVE PILLS: ICD-10-CM

## 2019-11-27 DIAGNOSIS — Z01.419 ENCOUNTER FOR GYNECOLOGICAL EXAMINATION WITHOUT ABNORMAL FINDING: Primary | ICD-10-CM

## 2019-11-27 PROCEDURE — 87624 HPV HI-RISK TYP POOLED RSLT: CPT | Performed by: OBSTETRICS & GYNECOLOGY

## 2019-11-27 PROCEDURE — 77063 BREAST TOMOSYNTHESIS BI: CPT | Mod: TC

## 2019-11-27 PROCEDURE — G0145 SCR C/V CYTO,THINLAYER,RESCR: HCPCS | Performed by: OBSTETRICS & GYNECOLOGY

## 2019-11-27 PROCEDURE — 99396 PREV VISIT EST AGE 40-64: CPT | Performed by: OBSTETRICS & GYNECOLOGY

## 2019-11-27 PROCEDURE — 77067 SCR MAMMO BI INCL CAD: CPT | Mod: TC

## 2019-11-27 RX ORDER — NORGESTIMATE AND ETHINYL ESTRADIOL 7DAYSX3 28
1 KIT ORAL DAILY
Qty: 84 TABLET | Refills: 4 | Status: SHIPPED | OUTPATIENT
Start: 2019-11-27 | End: 2020-12-01

## 2019-11-27 ASSESSMENT — ANXIETY QUESTIONNAIRES
7. FEELING AFRAID AS IF SOMETHING AWFUL MIGHT HAPPEN: NOT AT ALL
2. NOT BEING ABLE TO STOP OR CONTROL WORRYING: NOT AT ALL
GAD7 TOTAL SCORE: 1
IF YOU CHECKED OFF ANY PROBLEMS ON THIS QUESTIONNAIRE, HOW DIFFICULT HAVE THESE PROBLEMS MADE IT FOR YOU TO DO YOUR WORK, TAKE CARE OF THINGS AT HOME, OR GET ALONG WITH OTHER PEOPLE: NOT DIFFICULT AT ALL
6. BECOMING EASILY ANNOYED OR IRRITABLE: SEVERAL DAYS
5. BEING SO RESTLESS THAT IT IS HARD TO SIT STILL: NOT AT ALL
3. WORRYING TOO MUCH ABOUT DIFFERENT THINGS: NOT AT ALL
1. FEELING NERVOUS, ANXIOUS, OR ON EDGE: NOT AT ALL

## 2019-11-27 ASSESSMENT — PATIENT HEALTH QUESTIONNAIRE - PHQ9
5. POOR APPETITE OR OVEREATING: NOT AT ALL
SUM OF ALL RESPONSES TO PHQ QUESTIONS 1-9: 1

## 2019-11-27 ASSESSMENT — MIFFLIN-ST. JEOR: SCORE: 1641.27

## 2019-11-27 NOTE — LETTER
December 5, 2019    Kimberley Annandreina  [id number] [address] [place] [address]    Dear ,  This letter is regarding your recent Pap smear (cervical cancer screening) and Human Papillomavirus (HPV) test.  We are happy to inform you that your Pap smear result is normal. Cervical cancer is closely linked with certain types of HPV. Your results showed no evidence of high-risk HPV.  We recommend you have your next PAP smear in 3 years.  You will still need to return to the clinic every year for an annual exam and other preventive tests.  If you have additional questions regarding this result, please call our registered nurse, Arlene at [phone].  Sincerely,    Kamaljit Bailey MD/crystal

## 2019-11-28 ASSESSMENT — ANXIETY QUESTIONNAIRES: GAD7 TOTAL SCORE: 1

## 2019-12-03 LAB
COPATH REPORT: NORMAL
PAP: NORMAL

## 2020-02-15 DIAGNOSIS — Z30.41 ENCOUNTER FOR SURVEILLANCE OF CONTRACEPTIVE PILLS: ICD-10-CM

## 2020-02-17 RX ORDER — NORGESTIMATE AND ETHINYL ESTRADIOL 7DAYSX3 28
KIT ORAL
Qty: 84 TABLET | Refills: 4 | OUTPATIENT
Start: 2020-02-17

## 2020-02-17 NOTE — TELEPHONE ENCOUNTER
"Requested Prescriptions   Pending Prescriptions Disp Refills     TRI-SPRINTEC 0.18/0.215/0.25 MG-35 MCG tablet [Pharmacy Med Name: TRI-SPRINTEC TABLETS 28] 84 tablet 4     Sig: TAKE 1 TABLET BY MOUTH DAILY       Contraceptives Protocol Passed - 2/15/2020  8:24 AM        Passed - Patient is not a current smoker if age is 35 or older        Passed - Recent (12 mo) or future (30 days) visit within the authorizing provider's specialty     Patient has had an office visit with the authorizing provider or a provider within the authorizing providers department within the previous 12 mos or has a future within next 30 days. See \"Patient Info\" tab in inbasket, or \"Choose Columns\" in Meds & Orders section of the refill encounter.              Passed - Medication is active on med list        Passed - No active pregnancy on record        Passed - No positive pregnancy test in past 12 months        Refills available  Nury Mckeon RN on 2/17/2020 at 5:20 PM    "

## 2020-11-24 NOTE — PROGRESS NOTES
Kimberley is a 50 year old  female who presents for annual exam.     Besides routine health maintenance, she has no other health concerns today .    HPI: The patient is seen at this time for her annual exam.  She had Covid 3 weeks ago and was very sick for 8 days.  She has recovered everything but her sense of smell at this time.  The patient's PCP is  [facility].        GYNECOLOGIC HISTORY:    Patient's last menstrual period was 2020.    Regular menses? Yes on OCPs  Menses every 28-30 days.  Length of menses: 3 days    Her current contraception method is: oral contraceptives.  She  reports that she has never smoked. She has never used smokeless tobacco.    Patient is sexually active.    Last PHQ-9 score on record =   PHQ-9 SCORE 2020   PHQ-9 Total Score 1     Last GAD7 score on record =   DEBBIE-7 SCORE 2020   Total Score 1     Alcohol Score = 4    HEALTH MAINTENANCE:  Cholesterol: done at work  Last Mammo: 19, Result: Normal, Next Mammo: Today   Pap:   Lab Results   Component Value Date    PAP NIL HPV- 2019    PAP NIL 2018    PAP NIL 2017      Colonoscopy:  never, Result: Not applicable, Next Colonoscopy: age 52.  Dexa:  never    Health maintenance updated:  yes    HISTORY:  OB History    Para Term  AB Living   4 2 1 1 2 2   SAB TAB Ectopic Multiple Live Births   2 0 0 0 2      # Outcome Date GA Lbr Carlos/2nd Weight Sex Delivery Anes PTL Lv   4   36w0d   M -SEC   JOEL   3 SAB            2 Term     M -SEC   JOEL   1 SAB                Patient Active Problem List   Diagnosis     Non morbid obesity     Past Surgical History:   Procedure Laterality Date     breast lift       C/SECTION, CLASSICAL        Social History     Tobacco Use     Smoking status: Never Smoker     Smokeless tobacco: Never Used   Substance Use Topics     Alcohol use: Yes     Alcohol/week: 0.0 standard drinks     Comment: .occ      Problem (# of  "Occurrences) Relation (Name,Age of Onset)    Cerebrovascular Disease (1) Paternal Grandfather    Hyperlipidemia (2) Mother, Father            Current Outpatient Medications   Medication Sig     albuterol (PROAIR HFA, PROVENTIL HFA, VENTOLIN HFA) 108 (90 BASE) MCG/ACT inhaler Inhale 2 puffs into the lungs every 6 hours     betamethasone dipropionate (DIPROSONE) 0.05 % cream      fluticasone (FLONASE) 50 MCG/ACT spray PLACE 2 SPRAYS INTO BOTH NOSTRILS DAILY.     norgestim-eth estrad triphasic (TRI-SPRINTEC) 0.18/0.215/0.25 MG-35 MCG tablet Take 1 tablet by mouth daily     OMEPRAZOLE PO Take 10 mg by mouth daily     No current facility-administered medications for this visit.      Allergies   Allergen Reactions     Erythromycin Nausea       Past medical, surgical, social and family histories were reviewed and updated in EPIC.    ROS:   12 point review of systems negative other than symptoms noted below or in the HPI.  No urinary frequency or dysuria, bladder or kidney problems    EXAM:  /72   Pulse 84   Ht 1.689 m (5' 6.5\")   Wt 105.2 kg (232 lb)   LMP 11/22/2020   BMI 36.88 kg/m     BMI: Body mass index is 36.88 kg/m .    PHYSICAL EXAM:  Constitutional:   Appearance: Well nourished, well developed, alert, in no acute distress  Neck:  Lymph Nodes:  No lymphadenopathy present    Thyroid:  Gland size normal, nontender, no nodules or masses present  on palpation  Chest:  Respiratory Effort:  Breathing unlabored  Cardiovascular:    Heart: Auscultation:  Regular rate, normal rhythm, no murmurs present  Breasts: Inspection of Breasts:  No lymphadenopathy present., Palpation of Breasts and Axillae:  No masses present on palpation, no breast tenderness., Axillary Lymph Nodes:  No lymphadenopathy present. and No nodularity, asymmetry or nipple discharge bilaterally.  Reduction scars excellent gastrointestinal:   Abdominal Examination:  Abdomen nontender to palpation, tone normal without rigidity or guarding, no " masses present, umbilicus without lesions   Liver and Spleen:  No hepatomegaly present, liver nontender to palpation    Hernias:  No hernias present  Lymphatic: Lymph Nodes:  No other lymphadenopathy present  Skin:  General Inspection:  No rashes present, no lesions present, no areas of  discoloration  Neurologic:    Mental Status:  Oriented X3.  Normal strength and tone, sensory exam                grossly normal, mentation intact and speech normal.    Psychiatric:   Mentation appears normal and affect normal/bright.         Pelvic Exam:  External Genitalia:     Normal appearance for age, no discharge present, no tenderness present, no inflammatory lesions present, color normal  Vagina:     Normal vaginal vault without central or paravaginal defects, no discharge present, no inflammatory lesions present, no masses present  Bladder:     Nontender to palpation  Urethra:   Urethral Body:  Urethra palpation normal, urethra structural support normal   Urethral Meatus:  No erythema or lesions present  Cervix:     Appearance healthy, no lesions present, nontender to palpation, no bleeding present  Uterus:     Uterus: firm, normal sized and nontender, midplane in position.   Adnexa:     No adnexal tenderness present, no adnexal masses present  Perineum:     Perineum within normal limits, no evidence of trauma, no rashes or skin lesions present  Anus:     Anus within normal limits, no hemorrhoids present  Inguinal Lymph Nodes:     No lymphadenopathy present  Pubic Hair:     Normal pubic hair distribution for age  Genitalia and Groin:     No rashes present, no lesions present, no areas of discoloration, no masses present      COUNSELING:   Reviewed preventive health counseling, as reflected in patient instructions       Regular exercise       Healthy diet/nutrition       Osteoporosis prevention/bone health    BMI: Body mass index is 36.88 kg/m .      ASSESSMENT:  50 year old female with satisfactory annual exam.    ICD-10-CM     1. Encounter for gynecological examination without abnormal finding  Z01.419 HPV High Risk Types DNA Cervical     Pap imaged thin layer screen with HPV - recommended age 30 - 65 years (select HPV order below)   2. Encounter for surveillance of contraceptive pills  Z30.41 norgestim-eth estrad triphasic (TRI-SPRINTEC) 0.18/0.215/0.25 MG-35 MCG tablet       PLAN: We will contact the patient with her mammogram and Pap results.  She will continue to use all of the normal pandemic practices in spite of having had Dwight Bailey MD

## 2020-12-01 ENCOUNTER — ANCILLARY PROCEDURE (OUTPATIENT)
Dept: MAMMOGRAPHY | Facility: CLINIC | Age: 50
End: 2020-12-01
Payer: COMMERCIAL

## 2020-12-01 ENCOUNTER — OFFICE VISIT (OUTPATIENT)
Dept: OBGYN | Facility: CLINIC | Age: 50
End: 2020-12-01
Payer: COMMERCIAL

## 2020-12-01 VITALS
WEIGHT: 232 LBS | HEIGHT: 67 IN | HEART RATE: 84 BPM | DIASTOLIC BLOOD PRESSURE: 72 MMHG | SYSTOLIC BLOOD PRESSURE: 118 MMHG | BODY MASS INDEX: 36.41 KG/M2

## 2020-12-01 DIAGNOSIS — Z30.41 ENCOUNTER FOR SURVEILLANCE OF CONTRACEPTIVE PILLS: ICD-10-CM

## 2020-12-01 DIAGNOSIS — Z01.419 ENCOUNTER FOR GYNECOLOGICAL EXAMINATION WITHOUT ABNORMAL FINDING: Primary | ICD-10-CM

## 2020-12-01 DIAGNOSIS — Z12.31 VISIT FOR SCREENING MAMMOGRAM: ICD-10-CM

## 2020-12-01 PROCEDURE — 77063 BREAST TOMOSYNTHESIS BI: CPT | Performed by: RADIOLOGY

## 2020-12-01 PROCEDURE — 99396 PREV VISIT EST AGE 40-64: CPT | Performed by: OBSTETRICS & GYNECOLOGY

## 2020-12-01 PROCEDURE — 87624 HPV HI-RISK TYP POOLED RSLT: CPT | Performed by: OBSTETRICS & GYNECOLOGY

## 2020-12-01 PROCEDURE — 77067 SCR MAMMO BI INCL CAD: CPT | Performed by: RADIOLOGY

## 2020-12-01 PROCEDURE — G0145 SCR C/V CYTO,THINLAYER,RESCR: HCPCS | Performed by: OBSTETRICS & GYNECOLOGY

## 2020-12-01 RX ORDER — NORGESTIMATE AND ETHINYL ESTRADIOL 7DAYSX3 28
1 KIT ORAL DAILY
Qty: 84 TABLET | Refills: 4 | Status: SHIPPED | OUTPATIENT
Start: 2020-12-01 | End: 2021-01-12

## 2020-12-01 ASSESSMENT — ANXIETY QUESTIONNAIRES
1. FEELING NERVOUS, ANXIOUS, OR ON EDGE: NOT AT ALL
IF YOU CHECKED OFF ANY PROBLEMS ON THIS QUESTIONNAIRE, HOW DIFFICULT HAVE THESE PROBLEMS MADE IT FOR YOU TO DO YOUR WORK, TAKE CARE OF THINGS AT HOME, OR GET ALONG WITH OTHER PEOPLE: NOT DIFFICULT AT ALL
2. NOT BEING ABLE TO STOP OR CONTROL WORRYING: NOT AT ALL
GAD7 TOTAL SCORE: 1
7. FEELING AFRAID AS IF SOMETHING AWFUL MIGHT HAPPEN: NOT AT ALL
3. WORRYING TOO MUCH ABOUT DIFFERENT THINGS: SEVERAL DAYS
5. BEING SO RESTLESS THAT IT IS HARD TO SIT STILL: NOT AT ALL
6. BECOMING EASILY ANNOYED OR IRRITABLE: NOT AT ALL

## 2020-12-01 ASSESSMENT — PATIENT HEALTH QUESTIONNAIRE - PHQ9
SUM OF ALL RESPONSES TO PHQ QUESTIONS 1-9: 1
5. POOR APPETITE OR OVEREATING: NOT AT ALL

## 2020-12-01 ASSESSMENT — MIFFLIN-ST. JEOR: SCORE: 1707.04

## 2020-12-02 ASSESSMENT — ANXIETY QUESTIONNAIRES: GAD7 TOTAL SCORE: 1

## 2020-12-04 LAB
COPATH REPORT: NORMAL
PAP: NORMAL

## 2021-01-11 DIAGNOSIS — Z30.41 ENCOUNTER FOR SURVEILLANCE OF CONTRACEPTIVE PILLS: ICD-10-CM

## 2021-01-11 RX ORDER — NORGESTIMATE AND ETHINYL ESTRADIOL 7DAYSX3 28
KIT ORAL
Qty: 84 TABLET | Refills: 4 | OUTPATIENT
Start: 2021-01-11

## 2021-01-12 DIAGNOSIS — Z30.41 ENCOUNTER FOR SURVEILLANCE OF CONTRACEPTIVE PILLS: ICD-10-CM

## 2021-01-12 RX ORDER — NORGESTIMATE AND ETHINYL ESTRADIOL 7DAYSX3 28
1 KIT ORAL DAILY
Qty: 84 TABLET | Refills: 4 | Status: SHIPPED | OUTPATIENT
Start: 2021-01-12 | End: 2021-12-07

## 2021-04-28 ENCOUNTER — APPOINTMENT (OUTPATIENT)
Dept: URBAN - METROPOLITAN AREA CLINIC 259 | Age: 51
Setting detail: DERMATOLOGY
End: 2021-05-03

## 2021-04-28 DIAGNOSIS — L20.89 OTHER ATOPIC DERMATITIS: ICD-10-CM

## 2021-04-28 DIAGNOSIS — D22 MELANOCYTIC NEVI: ICD-10-CM

## 2021-04-28 PROBLEM — L20.84 INTRINSIC (ALLERGIC) ECZEMA: Status: ACTIVE | Noted: 2021-04-28

## 2021-04-28 PROBLEM — D22.9 MELANOCYTIC NEVI, UNSPECIFIED: Status: ACTIVE | Noted: 2021-04-28

## 2021-04-28 PROCEDURE — OTHER PRESCRIPTION: OTHER

## 2021-04-28 PROCEDURE — 99213 OFFICE O/P EST LOW 20 MIN: CPT

## 2021-04-28 PROCEDURE — OTHER COUNSELING: OTHER

## 2021-04-28 RX ORDER — BETAMETHASONE VALERATE 1 MG/G
CREAM TOPICAL
Qty: 1 | Refills: 2 | Status: ERX | COMMUNITY
Start: 2021-04-28

## 2021-04-28 ASSESSMENT — LOCATION DETAILED DESCRIPTION DERM
LOCATION DETAILED: RIGHT ULNAR PALM
LOCATION DETAILED: LEFT PROXIMAL PRETIBIAL REGION
LOCATION DETAILED: RIGHT PROXIMAL DORSAL FOREARM
LOCATION DETAILED: LEFT DISTAL DORSAL THUMB
LOCATION DETAILED: RIGHT MEDIAL PROXIMAL PRETIBIAL REGION
LOCATION DETAILED: LEFT ELBOW

## 2021-04-28 ASSESSMENT — LOCATION SIMPLE DESCRIPTION DERM
LOCATION SIMPLE: LEFT THUMB
LOCATION SIMPLE: RIGHT FOREARM
LOCATION SIMPLE: LEFT ELBOW
LOCATION SIMPLE: LEFT PRETIBIAL REGION
LOCATION SIMPLE: RIGHT PRETIBIAL REGION
LOCATION SIMPLE: RIGHT HAND

## 2021-04-28 ASSESSMENT — LOCATION ZONE DERM
LOCATION ZONE: FINGER
LOCATION ZONE: ARM
LOCATION ZONE: HAND
LOCATION ZONE: LEG

## 2021-12-07 ENCOUNTER — OFFICE VISIT (OUTPATIENT)
Dept: OBGYN | Facility: CLINIC | Age: 51
End: 2021-12-07
Payer: COMMERCIAL

## 2021-12-07 ENCOUNTER — ANCILLARY PROCEDURE (OUTPATIENT)
Dept: MAMMOGRAPHY | Facility: CLINIC | Age: 51
End: 2021-12-07
Payer: COMMERCIAL

## 2021-12-07 VITALS
DIASTOLIC BLOOD PRESSURE: 86 MMHG | WEIGHT: 234.2 LBS | BODY MASS INDEX: 39.02 KG/M2 | HEART RATE: 66 BPM | SYSTOLIC BLOOD PRESSURE: 144 MMHG | HEIGHT: 65 IN

## 2021-12-07 DIAGNOSIS — Z30.41 ENCOUNTER FOR SURVEILLANCE OF CONTRACEPTIVE PILLS: ICD-10-CM

## 2021-12-07 DIAGNOSIS — Z12.31 VISIT FOR SCREENING MAMMOGRAM: ICD-10-CM

## 2021-12-07 DIAGNOSIS — Z01.419 ENCOUNTER FOR GYNECOLOGICAL EXAMINATION WITHOUT ABNORMAL FINDING: Primary | ICD-10-CM

## 2021-12-07 PROCEDURE — G0145 SCR C/V CYTO,THINLAYER,RESCR: HCPCS | Performed by: OBSTETRICS & GYNECOLOGY

## 2021-12-07 PROCEDURE — 87624 HPV HI-RISK TYP POOLED RSLT: CPT | Performed by: OBSTETRICS & GYNECOLOGY

## 2021-12-07 PROCEDURE — 77067 SCR MAMMO BI INCL CAD: CPT | Mod: TC | Performed by: RADIOLOGY

## 2021-12-07 PROCEDURE — 77063 BREAST TOMOSYNTHESIS BI: CPT | Mod: TC | Performed by: RADIOLOGY

## 2021-12-07 PROCEDURE — 99396 PREV VISIT EST AGE 40-64: CPT | Performed by: OBSTETRICS & GYNECOLOGY

## 2021-12-07 RX ORDER — NORGESTIMATE AND ETHINYL ESTRADIOL 7DAYSX3 28
1 KIT ORAL DAILY
Qty: 84 TABLET | Refills: 4 | Status: SHIPPED | OUTPATIENT
Start: 2021-12-07 | End: 2023-01-09

## 2021-12-07 ASSESSMENT — ANXIETY QUESTIONNAIRES
5. BEING SO RESTLESS THAT IT IS HARD TO SIT STILL: NOT AT ALL
2. NOT BEING ABLE TO STOP OR CONTROL WORRYING: NOT AT ALL
7. FEELING AFRAID AS IF SOMETHING AWFUL MIGHT HAPPEN: NOT AT ALL
3. WORRYING TOO MUCH ABOUT DIFFERENT THINGS: NOT AT ALL
GAD7 TOTAL SCORE: 0
1. FEELING NERVOUS, ANXIOUS, OR ON EDGE: NOT AT ALL
6. BECOMING EASILY ANNOYED OR IRRITABLE: NOT AT ALL
IF YOU CHECKED OFF ANY PROBLEMS ON THIS QUESTIONNAIRE, HOW DIFFICULT HAVE THESE PROBLEMS MADE IT FOR YOU TO DO YOUR WORK, TAKE CARE OF THINGS AT HOME, OR GET ALONG WITH OTHER PEOPLE: NOT DIFFICULT AT ALL

## 2021-12-07 ASSESSMENT — PATIENT HEALTH QUESTIONNAIRE - PHQ9: 5. POOR APPETITE OR OVEREATING: NOT AT ALL

## 2021-12-07 ASSESSMENT — MIFFLIN-ST. JEOR: SCORE: 1692.16

## 2021-12-07 NOTE — PROGRESS NOTES
Kimberley is a 51 year old  female who presents for annual exam.     Besides routine health maintenance, she has no other health concerns today .    HPI: The patient is seen for annual exam at this time.  She is still on birth control pills for suppression at this time.  She is concerned that she has lost 1 inch in height.  She has not had a baseline bone density but does have a family history of osteoporosis in her mother and grandmother.  She is fully vaccinated.  The patient's PCP is [facility].        GYNECOLOGIC HISTORY:    Patient's last menstrual period was 2021 (approximate).    Regular menses? yes  Menses every 28-30 days.  Length of menses: 3-4 days    Her current contraception method is: oral contraceptives.  She  reports that she has never smoked. She has never used smokeless tobacco.    Patient is sexually active.  STD testing offered?  Declined  Last PHQ-9 score on record =   PHQ-9 SCORE 2021   PHQ-9 Total Score 1     Last GAD7 score on record =   DEBBIE-7 SCORE 2021   Total Score 0     Alcohol Score = 4    HEALTH MAINTENANCE:  Cholesterol: (No results found for: CHOL  Last Mammo: One year ago, Result: Normal, Next Mammo: Today  Pap:   Lab Results   Component Value Date    PAP NIL, HPV- 2020    PAP NIL 2019    PAP NIL 2018     Colonoscopy: 2021, Result: Polyp, Next Colonoscopy: 3 years.  Dexa:  N/a    Health maintenance updated:  yes    HISTORY:  OB History    Para Term  AB Living   4 2 1 1 2 2   SAB IAB Ectopic Multiple Live Births   2 0 0 0 2      # Outcome Date GA Lbr Carlos/2nd Weight Sex Delivery Anes PTL Lv   4   36w0d   M -SEC   JOEL   3 SAB            2 Term     M -SEC   JOEL   1 SAB                Patient Active Problem List   Diagnosis     Non morbid obesity     Past Surgical History:   Procedure Laterality Date     breast lift       C/SECTION, CLASSICAL        Social History     Tobacco Use  "    Smoking status: Never Smoker     Smokeless tobacco: Never Used   Substance Use Topics     Alcohol use: Yes     Alcohol/week: 0.0 standard drinks     Comment: .occ      Problem (# of Occurrences) Relation (Name,Age of Onset)    Cerebrovascular Disease (1) Paternal Grandfather    Hyperlipidemia (2) Mother, Father            Current Outpatient Medications   Medication Sig     albuterol (PROAIR HFA, PROVENTIL HFA, VENTOLIN HFA) 108 (90 BASE) MCG/ACT inhaler Inhale 2 puffs into the lungs every 6 hours     betamethasone dipropionate (DIPROSONE) 0.05 % cream      fluticasone (FLONASE) 50 MCG/ACT spray PLACE 2 SPRAYS INTO BOTH NOSTRILS DAILY.     norgestim-eth estrad triphasic (TRI-SPRINTEC) 0.18/0.215/0.25 MG-35 MCG tablet Take 1 tablet by mouth daily     OMEPRAZOLE PO Take 10 mg by mouth daily     No current facility-administered medications for this visit.     Allergies   Allergen Reactions     Erythromycin Nausea       Past medical, surgical, social and family histories were reviewed and updated in EPIC.    ROS:   12 point review of systems negative other than symptoms noted below or in the HPI.  No urinary frequency or dysuria, bladder or kidney problems    EXAM:  BP (!) 144/86   Pulse 66   Ht 1.657 m (5' 5.25\")   Wt 106.2 kg (234 lb 3.2 oz)   LMP 11/22/2021 (Approximate)   Breastfeeding No   BMI 38.67 kg/m     BMI: Body mass index is 38.67 kg/m .    PHYSICAL EXAM:  Constitutional:   Appearance: Well nourished, well developed, alert, in no acute distress  Neck:  Lymph Nodes:  No lymphadenopathy present    Thyroid:  Gland size normal, nontender, no nodules or masses present  on palpation  Chest:  Respiratory Effort:  Breathing unlabored  Cardiovascular:    Heart: Auscultation:  Regular rate, normal rhythm, no murmurs present  Breasts: Inspection of Breasts:  No lymphadenopathy present., Palpation of Breasts and Axillae:  No masses present on palpation, no breast tenderness., Axillary Lymph Nodes:  No " lymphadenopathy present. and No nodularity, asymmetry or nipple discharge bilaterally.  Gastrointestinal:   Abdominal Examination:  Abdomen nontender to palpation, tone normal without rigidity or guarding, no masses present, umbilicus without lesions   Liver and Spleen:  No hepatomegaly present, liver nontender to palpation    Hernias:  No hernias present  Lymphatic: Lymph Nodes:  No other lymphadenopathy present  Skin:  General Inspection:  No rashes present, no lesions present, no areas of  discoloration  Neurologic:    Mental Status:  Oriented X3.  Normal strength and tone, sensory exam                grossly normal, mentation intact and speech normal.    Psychiatric:   Mentation appears normal and affect normal/bright.         Pelvic Exam:  External Genitalia:     Normal appearance for age, no discharge present, no tenderness present, no inflammatory lesions present, color normal  Vagina:     Normal vaginal vault without central or paravaginal defects, no discharge present, no inflammatory lesions present, no masses present  Bladder:     Nontender to palpation  Urethra:   Urethral Body:  Urethra palpation normal, urethra structural support normal   Urethral Meatus:  No erythema or lesions present  Cervix:     Appearance healthy, no lesions present, nontender to palpation, no bleeding present  Uterus:     Uterus: firm, normal sized and nontender, anteverted in position.   Adnexa:     No adnexal tenderness present, no adnexal masses present  Perineum:     Perineum within normal limits, no evidence of trauma, no rashes or skin lesions present  Anus:     Anus within normal limits, no hemorrhoids present  Inguinal Lymph Nodes:     No lymphadenopathy present  Pubic Hair:     Normal pubic hair distribution for age  Genitalia and Groin:     No rashes present, no lesions present, no areas of discoloration, no masses present      COUNSELING:   Reviewed preventive health counseling, as reflected in patient instructions        Regular exercise       Healthy diet/nutrition    BMI: Body mass index is 38.67 kg/m .  Weight management plan: Discussed healthy diet and exercise guidelines    ASSESSMENT:  51 year old female with satisfactory annual exam.    ICD-10-CM    1. Encounter for gynecological examination without abnormal finding  Z01.419 Pap thin layer screen with HPV - recommended age 30 - 65 years     DX Hip/Pelvis/Spine   2. Encounter for surveillance of contraceptive pills  Z30.41 norgestim-eth estrad triphasic (TRI-SPRINTEC) 0.18/0.215/0.25 MG-35 MCG tablet       PLAN: We will convey the patient screening test.  She does need a baseline bone densitometry with her family history and being suppressed down on low-dose birth control pills.      Kamaljit Bailey MD

## 2021-12-08 ASSESSMENT — PATIENT HEALTH QUESTIONNAIRE - PHQ9: SUM OF ALL RESPONSES TO PHQ QUESTIONS 1-9: 1

## 2021-12-08 ASSESSMENT — ANXIETY QUESTIONNAIRES: GAD7 TOTAL SCORE: 0

## 2021-12-10 LAB
BKR LAB AP GYN ADEQUACY: NORMAL
BKR LAB AP GYN INTERPRETATION: NORMAL
BKR LAB AP HPV REFLEX: NORMAL
BKR LAB AP LMP: NORMAL
BKR LAB AP PREVIOUS ABNORMAL: NORMAL
PATH REPORT.COMMENTS IMP SPEC: NORMAL
PATH REPORT.COMMENTS IMP SPEC: NORMAL
PATH REPORT.RELEVANT HX SPEC: NORMAL

## 2021-12-14 LAB
HUMAN PAPILLOMA VIRUS 16 DNA: NEGATIVE
HUMAN PAPILLOMA VIRUS 18 DNA: NEGATIVE
HUMAN PAPILLOMA VIRUS FINAL DIAGNOSIS: NORMAL
HUMAN PAPILLOMA VIRUS OTHER HR: NEGATIVE

## 2021-12-22 ENCOUNTER — ANCILLARY PROCEDURE (OUTPATIENT)
Dept: BONE DENSITY | Facility: CLINIC | Age: 51
End: 2021-12-22
Attending: OBSTETRICS & GYNECOLOGY
Payer: COMMERCIAL

## 2021-12-22 DIAGNOSIS — Z01.419 ENCOUNTER FOR GYNECOLOGICAL EXAMINATION WITHOUT ABNORMAL FINDING: ICD-10-CM

## 2021-12-22 PROCEDURE — 77080 DXA BONE DENSITY AXIAL: CPT | Performed by: OBSTETRICS & GYNECOLOGY

## 2022-01-06 ENCOUNTER — TELEPHONE (OUTPATIENT)
Dept: OBGYN | Facility: CLINIC | Age: 52
End: 2022-01-06
Payer: COMMERCIAL

## 2022-01-06 NOTE — TELEPHONE ENCOUNTER
Pt would like to discuss her DEXA results from 12/22 - has not heard anything yet    Please callback: [phone]

## 2022-10-26 NOTE — TELEPHONE ENCOUNTER
"Refill request refused due to :   [x] Refills available at pharmacy.  Request sent back to pharmacy as \"duplicate\"  [] Patient report no longer needing it  [] Medication discontinued     Saige Montes RN on 1/11/2021 at 12:21 PM    "
"Requested Prescriptions   Pending Prescriptions Disp Refills     TRI-SPRINTEC 0.18/0.215/0.25 MG-35 MCG tablet [Pharmacy Med Name: TRI-SPRINTEC TABLETS 28'S] 84 tablet 4     Sig: TAKE 1 TABLET BY MOUTH DAILY       Contraceptives Protocol Passed - 1/11/2021 11:07 AM        Passed - Patient is not a current smoker if age is 35 or older        Passed - Recent (12 mo) or future (30 days) visit within the authorizing provider's specialty     Patient has had an office visit with the authorizing provider or a provider within the authorizing providers department within the previous 12 mos or has a future within next 30 days. See \"Patient Info\" tab in inbasket, or \"Choose Columns\" in Meds & Orders section of the refill encounter.              Passed - Medication is active on med list        Passed - No active pregnancy on record        Passed - No positive pregnancy test in past 12 months           Last Written Prescription Date:  12/01/2020  Last Fill Quantity: 84,  # refills: 4   Last office visit: 12/1/2020 with prescribing provider:  Kamaljit Bailey   Future Office Visit:  none          "
Yes

## 2022-12-23 ENCOUNTER — APPOINTMENT (OUTPATIENT)
Dept: URBAN - METROPOLITAN AREA CLINIC 259 | Age: 52
Setting detail: DERMATOLOGY
End: 2022-12-27

## 2022-12-23 DIAGNOSIS — L82.1 OTHER SEBORRHEIC KERATOSIS: ICD-10-CM

## 2022-12-23 DIAGNOSIS — L57.8 OTHER SKIN CHANGES DUE TO CHRONIC EXPOSURE TO NONIONIZING RADIATION: ICD-10-CM

## 2022-12-23 PROCEDURE — 99212 OFFICE O/P EST SF 10 MIN: CPT

## 2022-12-23 PROCEDURE — OTHER MIPS QUALITY: OTHER

## 2022-12-23 PROCEDURE — OTHER COUNSELING: OTHER

## 2022-12-23 ASSESSMENT — LOCATION DETAILED DESCRIPTION DERM
LOCATION DETAILED: LEFT SUPERIOR CENTRAL BUCCAL CHEEK
LOCATION DETAILED: RIGHT ANTERIOR PROXIMAL THIGH

## 2022-12-23 ASSESSMENT — LOCATION SIMPLE DESCRIPTION DERM
LOCATION SIMPLE: LEFT CHEEK
LOCATION SIMPLE: RIGHT THIGH

## 2022-12-23 ASSESSMENT — LOCATION ZONE DERM
LOCATION ZONE: LEG
LOCATION ZONE: FACE

## 2023-01-05 NOTE — PROGRESS NOTES
Kimberley is a 52 year old  female who presents for annual exam.     Besides routine health maintenance, she has no other health concerns today .    HPI:    Happy with OCPs , no concerns. REgular menses. No menopausal sx  First degree female relatives went through menopause as earlier ages than she is now.     On new meds for wt loss.       GYNECOLOGIC HISTORY:    Patient's last menstrual period was 12/10/2022.  Regular menses? yes  Menses every 30 days.  Length of menses: 4 days  Her current contraception method is: oral contraceptives.  She  reports that she has never smoked. She has never used smokeless tobacco.  Patient is sexually active.  STD testing offered?  Declined     Last PHQ-9 score on record =   PHQ-9 SCORE 2023   PHQ-9 Total Score 1     Last GAD7 score on record =   DEBBIE-7 SCORE 2023   Total Score 0     Alcohol Score = 2-3    HEALTH MAINTENANCE:  Cholesterol: found in Care Everywhere 04/15/21  Last Mammo: 21, Result: Normal, Next Mammo: Today   Pap:   Lab Results   Component Value Date    GYNINTERP NIL, NEG-HPV 2021     Negative for Intraepithelial Lesion or Malignancy (NILM)    PAP NIL, NEG-HPV 2020    PAP NIL, NEG-HPV 2019    PAP NIL, NEG-HPV 2018   Colonoscopy:  21, Result: Polyp, Next Colonoscopy: 3 years.  Dexa:  never  Health maintenance updated:  Immunizations reviewed YES     HISTORY:  OB History    Para Term  AB Living   4 2 1 1 2 2   SAB IAB Ectopic Multiple Live Births   2 0 0 0 2      # Outcome Date GA Lbr Carlos/2nd Weight Sex Delivery Anes PTL Lv   4   36w0d   M -SEC   JOEL   3 SAB            2 Term     M -SEC   JOEL   1 SAB                Patient Active Problem List   Diagnosis     Non morbid obesity     Past Surgical History:   Procedure Laterality Date     breast lift       C/SECTION, CLASSICAL      X 2      Social History     Tobacco Use     Smoking status: Never     Smokeless tobacco: Never   Substance  "Use Topics     Alcohol use: Yes     Alcohol/week: 0.0 standard drinks     Comment: .occ      Problem (# of Occurrences) Relation (Name,Age of Onset)    Cerebrovascular Disease (1) Paternal Grandfather    Factor V Leiden deficiency (3) Mother, Sister, Brother    Hyperlipidemia (2) Mother, Father            Current Outpatient Medications   Medication Sig     albuterol (PROAIR HFA, PROVENTIL HFA, VENTOLIN HFA) 108 (90 BASE) MCG/ACT inhaler Inhale 2 puffs into the lungs every 6 hours     buPROPion (WELLBUTRIN XL) 150 MG 24 hr tablet Take 150 mg by mouth every morning For wt loss     fluticasone (FLONASE) 50 MCG/ACT spray PLACE 2 SPRAYS INTO BOTH NOSTRILS DAILY.     naltrexone (DEPADE/REVIA) 50 MG tablet Take 50 mg by mouth daily For weight loss     norgestim-eth estrad triphasic (TRI-SPRINTEC) 0.18/0.215/0.25 MG-35 MCG tablet Take 1 tablet by mouth daily     OMEPRAZOLE PO Take 10 mg by mouth daily     betamethasone dipropionate (DIPROSONE) 0.05 % cream      No current facility-administered medications for this visit.     Allergies   Allergen Reactions     Erythromycin Nausea       Past medical, surgical, social and family histories were reviewed and updated in EPIC.    ROS:   12 point review of systems negative other than symptoms noted below or in the HPI.  No urinary frequency or dysuria, bladder or kidney problems    EXAM:  /72   Pulse 78   Ht 1.676 m (5' 6\")   Wt 107 kg (236 lb)   LMP 12/10/2022   BMI 38.09 kg/m     BMI: Body mass index is 38.09 kg/m .    PHYSICAL EXAM:  Constitutional:   Appearance: Well nourished, well developed, alert, in no acute distress  Neck:  Lymph Nodes:  No lymphadenopathy present    Thyroid:  Gland size normal, nontender, no nodules or masses present  on palpation  Chest:  Respiratory Effort:  Breathing unlabored  Cardiovascular:    Heart: Auscultation:  Regular rate, normal rhythm, no murmurs present  Breasts: Inspection of Breasts:  No lymphadenopathy present., Palpation of " Breasts and Axillae:  No masses present on palpation, no breast tenderness., Axillary Lymph Nodes:  No lymphadenopathy present. and No nodularity, asymmetry or nipple discharge bilaterally.  Gastrointestinal:   Abdominal Examination:  Abdomen nontender to palpation, tone normal without rigidity or guarding, no masses present, umbilicus without lesions   Liver and Spleen:  No hepatomegaly present, liver nontender to palpation    Hernias:  No hernias present  Lymphatic: Lymph Nodes:  No other lymphadenopathy present  Skin:  General Inspection:  No rashes present, no lesions present, no areas of  discoloration  Neurologic:    Mental Status:  Oriented X3.  Normal strength and tone, sensory exam                grossly normal, mentation intact and speech normal.    Psychiatric:   Mentation appears normal and affect normal/bright.         Pelvic Exam:  External Genitalia:     Normal appearance for age, no discharge present, no tenderness present, no inflammatory lesions present, color normal  Vagina:     Normal vaginal vault without central or paravaginal defects, no discharge present, no inflammatory lesions present, no masses present  Bladder:     Nontender to palpation  Urethra:   Urethral Body:  Urethra palpation normal, urethra structural support normal   Urethral Meatus:  No erythema or lesions present  Cervix:     Appearance healthy, no lesions present, nontender to palpation, no bleeding present  Uterus:     Uterus: firm, normal sized and nontender, midplane in position.   Adnexa:     No adnexal tenderness present, no adnexal masses present  Perineum:     Perineum within normal limits, no evidence of trauma, no rashes or skin lesions present  Anus:     Anus within normal limits, no hemorrhoids present  Inguinal Lymph Nodes:     No lymphadenopathy present  Pubic Hair:     Normal pubic hair distribution for age  Genitalia and Groin:     No rashes present, no lesions present, no areas of discoloration, no masses  present      COUNSELING:   Reviewed preventive health counseling, as reflected in patient instructions       Osteoporosis prevention/bone health    BMI: Body mass index is 38.09 kg/m .      ASSESSMENT:  52 year old female with satisfactory annual exam.    ICD-10-CM    1. Encounter for gynecological examination without abnormal finding  Z01.419       2. Encounter for surveillance of contraceptive pills  Z30.41 norgestim-eth estrad triphasic (TRI-SPRINTEC) 0.18/0.215/0.25 MG-35 MCG tablet          PLAN:  -UTD for cervical cancer screening. Manage per guidelines, including q 3yr pap testing for those <30 and q 5yr pap/hpv for those >30 when appropriate.   -Breast self awareness discussed. UTD for mammogram.  -Colonoscopy UTD, next 2024 (3yr)  -Osteoporosis prevention discussed.  -PCP manages labs  -Discussed menopause and perimenopause. Discussed needing to go off OCP to determine this, would then rec alternative contraception until can determine if menopausal. DIscussed definition of menopause 1yr w/o period. Reviewed potential for menopausal sx. Reviewed risks of OCPs including VTE/stroke.   Discussed trial of coming off OCPs to see how she does. May try to determine if menopausal or if still needs OCPs.   Refill x1yr given in event she decides to resume. Quesitons answered. Happy with plan   -Return one year for next annual exam          Anna Derass, DO

## 2023-01-09 ENCOUNTER — ANCILLARY PROCEDURE (OUTPATIENT)
Dept: MAMMOGRAPHY | Facility: CLINIC | Age: 53
End: 2023-01-09
Payer: COMMERCIAL

## 2023-01-09 ENCOUNTER — OFFICE VISIT (OUTPATIENT)
Dept: OBGYN | Facility: CLINIC | Age: 53
End: 2023-01-09
Payer: COMMERCIAL

## 2023-01-09 VITALS
WEIGHT: 236 LBS | HEIGHT: 66 IN | HEART RATE: 78 BPM | SYSTOLIC BLOOD PRESSURE: 118 MMHG | DIASTOLIC BLOOD PRESSURE: 72 MMHG | BODY MASS INDEX: 37.93 KG/M2

## 2023-01-09 DIAGNOSIS — Z01.419 ENCOUNTER FOR GYNECOLOGICAL EXAMINATION WITHOUT ABNORMAL FINDING: Primary | ICD-10-CM

## 2023-01-09 DIAGNOSIS — Z12.31 VISIT FOR SCREENING MAMMOGRAM: ICD-10-CM

## 2023-01-09 DIAGNOSIS — Z30.41 ENCOUNTER FOR SURVEILLANCE OF CONTRACEPTIVE PILLS: ICD-10-CM

## 2023-01-09 PROCEDURE — 77063 BREAST TOMOSYNTHESIS BI: CPT | Mod: TC | Performed by: STUDENT IN AN ORGANIZED HEALTH CARE EDUCATION/TRAINING PROGRAM

## 2023-01-09 PROCEDURE — 77067 SCR MAMMO BI INCL CAD: CPT | Mod: TC | Performed by: STUDENT IN AN ORGANIZED HEALTH CARE EDUCATION/TRAINING PROGRAM

## 2023-01-09 PROCEDURE — 99396 PREV VISIT EST AGE 40-64: CPT | Performed by: OBSTETRICS & GYNECOLOGY

## 2023-01-09 RX ORDER — BUPROPION HYDROCHLORIDE 150 MG/1
150 TABLET ORAL EVERY MORNING
COMMUNITY
End: 2024-05-03

## 2023-01-09 RX ORDER — NALTREXONE HYDROCHLORIDE 50 MG/1
50 TABLET, FILM COATED ORAL DAILY
COMMUNITY
End: 2024-05-03

## 2023-01-09 RX ORDER — NORGESTIMATE AND ETHINYL ESTRADIOL 7DAYSX3 28
1 KIT ORAL DAILY
Qty: 84 TABLET | Refills: 4 | Status: SHIPPED | OUTPATIENT
Start: 2023-01-09 | End: 2024-01-05

## 2023-01-09 ASSESSMENT — ANXIETY QUESTIONNAIRES
5. BEING SO RESTLESS THAT IT IS HARD TO SIT STILL: NOT AT ALL
IF YOU CHECKED OFF ANY PROBLEMS ON THIS QUESTIONNAIRE, HOW DIFFICULT HAVE THESE PROBLEMS MADE IT FOR YOU TO DO YOUR WORK, TAKE CARE OF THINGS AT HOME, OR GET ALONG WITH OTHER PEOPLE: NOT DIFFICULT AT ALL
7. FEELING AFRAID AS IF SOMETHING AWFUL MIGHT HAPPEN: NOT AT ALL
GAD7 TOTAL SCORE: 0
3. WORRYING TOO MUCH ABOUT DIFFERENT THINGS: NOT AT ALL
6. BECOMING EASILY ANNOYED OR IRRITABLE: NOT AT ALL
2. NOT BEING ABLE TO STOP OR CONTROL WORRYING: NOT AT ALL
1. FEELING NERVOUS, ANXIOUS, OR ON EDGE: NOT AT ALL
GAD7 TOTAL SCORE: 0

## 2023-01-09 ASSESSMENT — PATIENT HEALTH QUESTIONNAIRE - PHQ9
SUM OF ALL RESPONSES TO PHQ QUESTIONS 1-9: 1
5. POOR APPETITE OR OVEREATING: NOT AT ALL

## 2023-01-09 NOTE — PATIENT INSTRUCTIONS
-Daily total calcium intake (between food/supplements) should be 1200mg which equates to 5 servings calcium containing food per day; VItamin D 1000IU.   Foods rich in calcium are: milk, cheese, yogurt, seafood, sardines and canned salmon, leafy green vegetables such as miguelina greens, spinach and kale, beans and lentils, almonds, seeds (poppy, sesame, celery, jesi), rhubarb, dried fruit such as figs, whey protein, tofu and edamame, amaranth, other foods with added calcium such as orange juice and some cereals.   If adequate amount not taken in diet, then a supplement may be needed.     -I also recommend increasing your dietary fiber by starting Metamucil (powder mixed in glass of water) once to twice daily

## 2023-02-03 DIAGNOSIS — Z30.41 ENCOUNTER FOR SURVEILLANCE OF CONTRACEPTIVE PILLS: ICD-10-CM

## 2023-02-03 RX ORDER — NORGESTIMATE AND ETHINYL ESTRADIOL 7DAYSX3 28
KIT ORAL
Qty: 84 TABLET | Refills: 4 | OUTPATIENT
Start: 2023-02-03

## 2023-02-03 NOTE — TELEPHONE ENCOUNTER
"Requested Prescriptions   Pending Prescriptions Disp Refills     TRI-SPRINTEC 0.18/0.215/0.25 MG-35 MCG tablet [Pharmacy Med Name: TRI-SPRINTEC TABLETS 28S] 84 tablet 4     Sig: TAKE 1 TABLET BY MOUTH DAILY       Contraceptives Protocol Passed - 2/3/2023  3:12 AM        Passed - Patient is not a current smoker if age is 35 or older        Passed - Recent (12 mo) or future (30 days) visit within the authorizing provider's specialty     Patient has had an office visit with the authorizing provider or a provider within the authorizing providers department within the previous 12 mos or has a future within next 30 days. See \"Patient Info\" tab in inbasket, or \"Choose Columns\" in Meds & Orders section of the refill encounter.              Passed - Medication is active on med list        Passed - No active pregnancy on record        Passed - No positive pregnancy test in past 12 months           Refills available  Nury Mckeon RN on 2/3/2023 at 10:41 AM    "

## 2024-01-05 DIAGNOSIS — Z30.41 ENCOUNTER FOR SURVEILLANCE OF CONTRACEPTIVE PILLS: ICD-10-CM

## 2024-01-05 RX ORDER — NORGESTIMATE AND ETHINYL ESTRADIOL 7DAYSX3 28
1 KIT ORAL DAILY
Qty: 28 TABLET | Refills: 0 | Status: SHIPPED | OUTPATIENT
Start: 2024-01-05 | End: 2024-03-25

## 2024-01-05 NOTE — TELEPHONE ENCOUNTER
Requested Prescriptions   Pending Prescriptions Disp Refills    TRI-SPRINTEC 0.18/0.215/0.25 MG-35 MCG tablet [Pharmacy Med Name: TRI-SPRINTEC TABLETS 28S] 84 tablet 4     Sig: TAKE 1 TABLET BY MOUTH DAILY       Contraceptives Protocol Passed - 1/5/2024  8:08 AM        Passed - Patient is not a current smoker if age is 35 or older        Passed - Recent (12 mo) or future (30 days) visit within the authorizing provider's specialty     The patient must have completed an in-person or virtual visit within the past 12 months or has a future visit scheduled within the next 90 days with the authorizing provider s specialty.  Urgent care and e-visits do not quality as an office visit for this protocol.          Passed - Medication is active on med list        Passed - No active pregnancy on record        Passed - No positive pregnancy test in past 12 months           Last Written Prescription Date:  1/9/23  Last Fill Quantity: 84,  # refills: 4   Last office visit: 1/9/2023 ; last virtual visit: Visit date not found with prescribing provider:  Adonis    Future Office Visit:  none    Rx refilled for 1 month. Needs appt for further refills, due for annual  Gabriela Weiner RN on 1/5/2024 at 8:18 AM

## 2024-01-12 ENCOUNTER — HOSPITAL ENCOUNTER (OUTPATIENT)
Dept: MAMMOGRAPHY | Facility: CLINIC | Age: 54
Discharge: HOME | End: 2024-01-12
Payer: COMMERCIAL

## 2024-01-12 DIAGNOSIS — Z12.31 VISIT FOR SCREENING MAMMOGRAM: ICD-10-CM

## 2024-01-12 PROCEDURE — 77063 BREAST TOMOSYNTHESIS BI: CPT

## 2024-02-23 ENCOUNTER — APPOINTMENT (OUTPATIENT)
Dept: URBAN - METROPOLITAN AREA CLINIC 259 | Age: 54
Setting detail: DERMATOLOGY
End: 2024-02-27

## 2024-02-23 DIAGNOSIS — L20.89 OTHER ATOPIC DERMATITIS: ICD-10-CM

## 2024-02-23 DIAGNOSIS — K13.0 DISEASES OF LIPS: ICD-10-CM

## 2024-02-23 PROCEDURE — OTHER PRESCRIPTION: OTHER

## 2024-02-23 PROCEDURE — OTHER MIPS QUALITY: OTHER

## 2024-02-23 PROCEDURE — OTHER PRESCRIPTION MEDICATION MANAGEMENT: OTHER

## 2024-02-23 PROCEDURE — 99214 OFFICE O/P EST MOD 30 MIN: CPT

## 2024-02-23 PROCEDURE — OTHER COUNSELING: OTHER

## 2024-02-23 RX ORDER — HYDROCORTISONE 25 MG/G
OINTMENT TOPICAL BID
Qty: 28.35 | Refills: 1 | Status: ERX | COMMUNITY
Start: 2024-02-23

## 2024-02-23 ASSESSMENT — LOCATION ZONE DERM
LOCATION ZONE: EYELID
LOCATION ZONE: LIP
LOCATION ZONE: FACE

## 2024-02-23 ASSESSMENT — LOCATION DETAILED DESCRIPTION DERM
LOCATION DETAILED: RIGHT INFERIOR VERMILION LIP
LOCATION DETAILED: LEFT SUPRATARSAL CREASE
LOCATION DETAILED: RIGHT CENTRAL EYEBROW

## 2024-02-23 ASSESSMENT — LOCATION SIMPLE DESCRIPTION DERM
LOCATION SIMPLE: RIGHT LIP
LOCATION SIMPLE: RIGHT EYEBROW
LOCATION SIMPLE: LEFT SUPERIOR EYELID

## 2024-02-23 NOTE — HPI: RASH
What Type Of Note Output Would You Prefer (Optional)?: Standard Output
How Severe Is Your Rash?: mild
Is This A New Presentation, Or A Follow-Up?: Rash
Additional History: Patient has a history of atopic dermatitis. Patient currently uses Betamethasone for atopic derm. She notes having eczema located on her eyelids. She uses otc hydrocortisone. Patient reports having a throat issue since January. She has been to her PCP twice and was diagnosed with tonsillitis. She was prescribed amoxicillin. Patient recently started saxenda which has caused her to get a sore throat. Patient presents for evaluation and management.

## 2024-02-23 NOTE — PROCEDURE: PRESCRIPTION MEDICATION MANAGEMENT
Detail Level: Zone
Plan: Advised patient to only use aquaphor or Vaseline on lips.
Render In Strict Bullet Format?: No
Initiate Treatment: Hydrocortisone 2.5% topical ointment
Plan: Discussed trying protopic if rash persists. Patient okay to call for protopic if rash does not clear in 2 weeks.
Initiate Treatment: Hydrocortisone 2.5% topical ointment PRN with flares

## 2024-03-23 DIAGNOSIS — Z30.41 ENCOUNTER FOR SURVEILLANCE OF CONTRACEPTIVE PILLS: ICD-10-CM

## 2024-03-25 ENCOUNTER — MYC MEDICAL ADVICE (OUTPATIENT)
Dept: OBGYN | Facility: CLINIC | Age: 54
End: 2024-03-25
Payer: COMMERCIAL

## 2024-03-25 DIAGNOSIS — Z30.41 ENCOUNTER FOR SURVEILLANCE OF CONTRACEPTIVE PILLS: ICD-10-CM

## 2024-03-25 RX ORDER — NORGESTIMATE AND ETHINYL ESTRADIOL 7DAYSX3 28
1 KIT ORAL DAILY
Qty: 28 TABLET | Refills: 0 | Status: SHIPPED | OUTPATIENT
Start: 2024-03-25

## 2024-03-25 RX ORDER — NORGESTIMATE AND ETHINYL ESTRADIOL 7DAYSX3 28
1 KIT ORAL DAILY
Qty: 28 TABLET | Refills: 0 | OUTPATIENT
Start: 2024-03-25

## 2024-03-25 NOTE — TELEPHONE ENCOUNTER
Requested Prescriptions   Pending Prescriptions Disp Refills    TRI-SPRINTEC 0.18/0.215/0.25 MG-35 MCG tablet [Pharmacy Med Name: TRI-SPRINTEC TABLETS 28S] 28 tablet 0     Sig: TAKE 1 TABLET BY MOUTH DAILY       Contraceptives Protocol Failed - 3/23/2024  8:08 AM        Failed - Recent (12 mo) or future (30 days) visit within the authorizing provider's specialty     The patient must have completed an in-person or virtual visit within the past 12 months or has a future visit scheduled within the next 90 days with the authorizing provider s specialty.  Urgent care and e-visits do not quality as an office visit for this protocol.          Passed - Patient is not a current smoker if age is 35 or older        Passed - Medication is active on med list        Passed - No active pregnancy on record        Passed - No positive pregnancy test in past 12 months           Last Written Prescription Date:  1/5/24  Last Fill Quantity: 28,  # refills: 0   Last office visit: 1/9/2023 ; last virtual visit: Visit date not found with prescribing provider:      Future Office Visit:  none    Pt due for annual, no appt scheduled. Pt already received one month extension. Rx denied.     Gabriela Weiner RN on 3/25/2024 at 8:46 AM

## 2024-04-24 NOTE — PROGRESS NOTES
Kimberley is a 53 year old  female who presents for annual exam.     Besides routine health maintenance, she has no other health concerns today .    HPI:  The patient's PCP is [facility]. Completes annual labs with PCP.    Kimberley presents today for annual exam. She has been generally healthy over the past year. Completes biweekly strength training, regular walking. She started Semaglutide (Ozempic) and has had 40lb weight loss, no significant side effects. Liposuction yesterday, sore but healing well.     Patient has been using CHCs for many years, wondering about menopause as women in her family have gone through menopause at or earlier than her current age. She is nervous about experiencing V.M. symptoms if she stops CHCs as she has friends who have significant symptoms.     No other questions or concerns today.    GYNECOLOGIC HISTORY:    Patient's last menstrual period was 2024 (approximate).    Regular menses? yes  Menses every 28-30 days.  Length of menses: 4 days    Her current contraception method is: oral contraceptives.  She  reports that she has never smoked. She has never used smokeless tobacco.    Patient is sexually active.  STD testing offered?  Declined  Last PHQ-9 score on record =       5/3/2024     9:24 AM   PHQ-9 SCORE   PHQ-9 Total Score 1     Last GAD7 score on record =       5/3/2024     9:24 AM   DEBBIE-7 SCORE   Total Score 0     Alcohol Score = 3    HEALTH MAINTENANCE:    Pap: (  Lab Results   Component Value Date    GYNINTERP  2021     Negative for Intraepithelial Lesion or Malignancy (NILM) Neg HPV    PAP NIL Neg HPV 2020    PAP NIL Neg HPV 2019    PAP NIL Neg HPV 2018      Health maintenance updated:  yes    Care Gaps    Overdue          Never done ADVANCE CARE PLANNING (Every 5 Years)     Never done LIPID (Every 5 Years)     SEP 1  2023 COVID-19 Vaccine ( season)  Last completed: Sep 28, 2022     DEC 26  2023 HEPATITIS B  IMMUNIZATION (2 of 2 - CpG 2-dose series)  Last completed:  COLORECTAL CANCER SCREENING (COLONOSCOPY - Required) (Every 3 Years)   Last completed: 2021         Upcoming          2024 YEARLY PREVENTIVE VISIT (Yearly)   Last completed:  MAMMO SCREENING (Yearly)  Last completed:  GLUCOSE (Every 3 Years)  Last completed: 2023     DEC 7  2026 HPV TEST (Once)   Order placed this encounter     DEC 7  2026 PAP (Every 5 Years)   Order placed this encounter     2033 DTAP/TDAP/TD IMMUNIZATION (3 - Td or Tdap)  Last completed: 2023       Overdue          Never done ADVANCE CARE PLANNING (Every 5 Years)     Never done GLUCOSE (Every 3 Years)     Never done HIV SCREENING (Once)     Never done HEPATITIS C SCREENING (Once)     Never done LIPID (Every 5 Years)     SEP 1  2023 COVID-19 Vaccine ( season)  Last completed: Sep 28, 2022     DEC 26  2023 HEPATITIS B IMMUNIZATION (2 of 2 - CpG 2-dose series)   Last completed:  PHQ-2 (once per calendar year) (Yearly, January to December)  Last completed:  COLORECTAL CANCER SCREENING (COLONOSCOPY - Required) (Every 3 Years)   Last completed: 2021       HISTORY:  OB History    Para Term  AB Living   4 2 1 1 2 2   SAB IAB Ectopic Multiple Live Births   2 0 0 0 2      # Outcome Date GA Lbr Carlos/2nd Weight Sex Type Anes PTL Lv   4   36w0d   M -SEC   JOEL   3 SAB            2 Term     M -SEC   JOEL   1 SAB                Patient Active Problem List   Diagnosis    Non morbid obesity     Past Surgical History:   Procedure Laterality Date    breast lift      C/SECTION, CLASSICAL      X 2      Social History     Tobacco Use    Smoking status: Never    Smokeless tobacco: Never   Substance Use Topics    Alcohol use: Yes     Alcohol/week: 0.0 standard drinks of alcohol      "Comment: .occ      Problem (# of Occurrences) Relation (Name,Age of Onset)    Cerebrovascular Disease (1) Paternal Grandfather    Factor V Leiden deficiency (3) Mother, Sister, Brother    Hyperlipidemia (2) Mother, Father              Current Outpatient Medications   Medication Sig Dispense Refill    albuterol (PROAIR HFA, PROVENTIL HFA, VENTOLIN HFA) 108 (90 BASE) MCG/ACT inhaler Inhale 2 puffs into the lungs every 6 hours      betamethasone dipropionate (DIPROSONE) 0.05 % cream   5    fluticasone (FLONASE) 50 MCG/ACT spray PLACE 2 SPRAYS INTO BOTH NOSTRILS DAILY.  11    norgestim-eth estrad triphasic (TRI-SPRINTEC) 0.18/0.215/0.25 MG-35 MCG tablet Take 1 tablet by mouth daily 28 tablet 0    OMEPRAZOLE PO Take 10 mg by mouth daily      semaglutide (OZEMPIC) 2 MG/1.5ML SOPN pen       topiramate (TOPAMAX) 25 MG capsule        No current facility-administered medications for this visit.     Allergies   Allergen Reactions    Erythromycin Nausea       Past medical, surgical, social and family histories were reviewed and updated in [facility].    EXAM:  /68   Ht 1.662 m (5' 5.43\")   Wt 90.3 kg (199 lb)   LMP 04/19/2024 (Approximate)   BMI 32.68 kg/m     BMI: Body mass index is 32.68 kg/m .    PHYSICAL EXAM:  Constitutional:   Appearance: Well nourished, well developed, alert, in no acute distress  Neck:  Lymph Nodes:  No lymphadenopathy present    Thyroid:  Gland size normal, nontender, no nodules or masses present  on palpation  Chest:  Respiratory Effort:  Breathing unlabored  Cardiovascular:    Heart: Auscultation:  Regular rate, normal rhythm, no murmurs present  Breasts: Inspection of Breasts:  No lymphadenopathy present., Palpation of Breasts and Axillae:  No masses present on palpation, no breast tenderness., Axillary Lymph Nodes:  No lymphadenopathy present., and No nodularity, asymmetry or nipple discharge bilaterally.  Gastrointestinal:   Abdominal Examination:  Abdomen nontender to palpation, tone normal " without rigidity or guarding, no masses present, umbilicus without lesions   Liver and Spleen:  No hepatomegaly present, liver nontender to palpation    Hernias:  No hernias present  Lymphatic: Lymph Nodes:  No other lymphadenopathy present  Skin:  General Inspection:  No rashes present, no lesions present, no areas of  discoloration  Neurologic:    Mental Status:  Oriented X3.  Normal strength and tone, sensory exam                grossly normal, mentation intact and speech normal.    Psychiatric:   Mentation appears normal and affect normal/bright.         Pelvic Exam:  External Genitalia:     Normal appearance for age, no discharge present, no tenderness present, no inflammatory lesions present, color normal  Vagina:     Normal vaginal vault without central or paravaginal defects, no discharge present, no inflammatory lesions present, no masses present  Bladder:     Nontender to palpation  Urethra:   Urethral Body:  Urethra palpation normal, urethra structural support normal   Urethral Meatus:  No erythema or lesions present  Cervix:     Appearance healthy, no lesions present, nontender to palpation, no bleeding present, PAP COLLECTED  Uterus:     Uterus: firm, normal sized and nontender,   Adnexa:     No adnexal tenderness present, no adnexal masses present  Perineum:     Perineum within normal limits, no evidence of trauma, no rashes or skin lesions present  Anus:     Anus within normal limits, no hemorrhoids present  Inguinal Lymph Nodes:     No lymphadenopathy present  Pubic Hair:     Normal pubic hair distribution for age  Genitalia and Groin:     No rashes present, no lesions present, no areas of discoloration, no masses present    COUNSELING:   Reviewed preventive health counseling, as reflected in patient instructions       Regular exercise       Healthy diet/nutrition       Contraception       (Lidya)menopause management    BMI: Body mass index is 32.68 kg/m .  Weight management plan: Specific weight  management program called Ozempic, Rx'd by PCP discussed    ASSESSMENT:  53 year old female with satisfactory annual exam.    ICD-10-CM    1. Encounter for gynecological examination without abnormal finding  Z01.419 Colonoscopy Screening  Referral      2. Encounter for surveillance of contraceptive pills  Z30.41 Colonoscopy Screening  Referral      3. Cervical cancer screening  Z12.4 Pap thin layer screen with HPV - recommended age 30 - 65 years     Colonoscopy Screening  Referral      4. Screen for colon cancer  Z12.11 Colonoscopy Screening  Referral          PLAN:  Patient presents for annual exam.    Health  Maintenance  - annual labs done with PCP  - UTD on mammogram (done 01/2024)  - Colonoscopy order placed today (done 04/2021, 3 year repeat 2/2 polyps)  - PAP updated today with shared decision making (last done 12/2021 NIL, -HPV)  - patient has completed advance care planning with partner    Perimenopause / CHCs  - discussed increasing risk of HTN and blood clots with use of CHCs as age increases  - counseled patient that unable to know if she has gone through menopause while on these medications, cannot complete lab indicators as medication will mask levels  - counseled patient on availability of HRT treatment if she experiences significant V.M. symptoms, patient will call with concerns  - patient willing to trial discontinuation of CHCs, will complete current pack and then stop medication  - discussed use of condoms for contraception as unknown if menopausal     She can return in 1 year for annual exam or sooner with concerns    Rebecca Sheikh PA-C

## 2024-04-29 NOTE — PATIENT INSTRUCTIONS
Thank you for visiting the [facility].    A referral for colonoscopy was placed on your behalf. You should receive a call from the [facility] service within 2 business days. If you don t hear from a representative within 2 business days, please call [phone].     We completed your PAP smear today for cervical cancer screening. You will receive your results via our PAP nurse team as soon as they are available.     Today we discussed oral contraceptive pills and perimenopause vs menopause. You should complete your current pack of birth control pills and then we can trial a period without them to determine if you are experiencing menopause. You should use a back up method (such as condoms) for contraception during this time to prevent pregnancy. If you begin to have perimenopause/menopause symptoms that are not tolerable with conservative measures (fans, layers, etc), please give us a call so we can discuss treatment options.     We care about your health! Here are some general tips to help you stay as healthy as possible:    - What we eat and drink provides the nutrients we need to keep our bodies working well. Try to eat a variety of foods including lots of vegetables, fruits, whole grains, and proteins. Try to limit foods and beverages with high sodium, trans fats, added sugars, and alcohol.    - Physical activity is beneficial for both your physical and mental health. Aim for at least 2.5 hours of medium-intensity (walking, biking, yoga, housework, etc) or 1.25 hours of high-intensity (running, jump rope, rowing, etc) aerobic physical activity per week in addition to engaging in strengthening activities at least 2 times per week. Start small and work towards a goal. Any activity is good activity!    - If you are not preventing pregnancy, take a folic acid supplement of 0.4 mg/day.    - Calcium helps build and maintain strong bones, muscles, and nerves. Daily total calcium intake  (food + supplements) should be 1000mg (equal to 3-4 servings of calcium rich foods such as milk, cheese, yogurt, seafood, leafy green vegetables, edamame, orange juice with added calcium, and some cereals, among others)    - STI testing is always available to you. Please contact the office whenever you would like to update your testing or have a new or potential exposure.    - Follow sexually transmitted infection (STI) prevention tips: talk to all partners about STI testing, use condoms or other barrier methods to protect yourself and others, and get tested for STIs with each new partner.    - Return for a preventative visit every year    ADVANCE DIRECTIVE  We recommend that everyone make an advance directive about their health care goals and update it every 5 years.    When it comes to decisions about your health, it s important that your health care goals, values and wishes are known. In the event of a sudden injury or illness, you may not be able to communicate your choices. We encourage you to begin by thinking about what matters most to you. Have conversations with family, friends, cultural and/or yadiel leaders, and your health care team. Then, share your goals and wishes for current and future healthcare so your voice is heard when treatment decisions need to be made. See the link below for help with talking about and sharing what is most important to you:     [email or web address]    Please do not hesitate to contact the office if you have any questions or concerns.

## 2024-05-03 ENCOUNTER — OFFICE VISIT (OUTPATIENT)
Dept: OBGYN | Facility: CLINIC | Age: 54
End: 2024-05-03
Payer: COMMERCIAL

## 2024-05-03 VITALS
WEIGHT: 199 LBS | SYSTOLIC BLOOD PRESSURE: 132 MMHG | HEIGHT: 65 IN | DIASTOLIC BLOOD PRESSURE: 68 MMHG | BODY MASS INDEX: 33.15 KG/M2

## 2024-05-03 DIAGNOSIS — Z12.4 CERVICAL CANCER SCREENING: ICD-10-CM

## 2024-05-03 DIAGNOSIS — Z12.11 SCREEN FOR COLON CANCER: ICD-10-CM

## 2024-05-03 DIAGNOSIS — Z30.41 ENCOUNTER FOR SURVEILLANCE OF CONTRACEPTIVE PILLS: ICD-10-CM

## 2024-05-03 DIAGNOSIS — Z01.419 ENCOUNTER FOR GYNECOLOGICAL EXAMINATION WITHOUT ABNORMAL FINDING: Primary | ICD-10-CM

## 2024-05-03 PROCEDURE — 87624 HPV HI-RISK TYP POOLED RSLT: CPT

## 2024-05-03 PROCEDURE — 99396 PREV VISIT EST AGE 40-64: CPT

## 2024-05-03 PROCEDURE — G0145 SCR C/V CYTO,THINLAYER,RESCR: HCPCS

## 2024-05-03 PROCEDURE — 99459 PELVIC EXAMINATION: CPT

## 2024-05-03 RX ORDER — NORGESTIMATE AND ETHINYL ESTRADIOL 7DAYSX3 28
1 KIT ORAL DAILY
Qty: 28 TABLET | Refills: 0 | Status: CANCELLED | OUTPATIENT
Start: 2024-05-03

## 2024-05-03 RX ORDER — TOPIRAMATE SPINKLE 25 MG/1
CAPSULE ORAL
COMMUNITY
Start: 2023-08-01

## 2024-05-03 ASSESSMENT — PATIENT HEALTH QUESTIONNAIRE - PHQ9
5. POOR APPETITE OR OVEREATING: NOT AT ALL
SUM OF ALL RESPONSES TO PHQ QUESTIONS 1-9: 1

## 2024-05-03 ASSESSMENT — ANXIETY QUESTIONNAIRES
6. BECOMING EASILY ANNOYED OR IRRITABLE: NOT AT ALL
1. FEELING NERVOUS, ANXIOUS, OR ON EDGE: NOT AT ALL
IF YOU CHECKED OFF ANY PROBLEMS ON THIS QUESTIONNAIRE, HOW DIFFICULT HAVE THESE PROBLEMS MADE IT FOR YOU TO DO YOUR WORK, TAKE CARE OF THINGS AT HOME, OR GET ALONG WITH OTHER PEOPLE: NOT DIFFICULT AT ALL
2. NOT BEING ABLE TO STOP OR CONTROL WORRYING: NOT AT ALL
3. WORRYING TOO MUCH ABOUT DIFFERENT THINGS: NOT AT ALL
5. BEING SO RESTLESS THAT IT IS HARD TO SIT STILL: NOT AT ALL
GAD7 TOTAL SCORE: 0
7. FEELING AFRAID AS IF SOMETHING AWFUL MIGHT HAPPEN: NOT AT ALL
GAD7 TOTAL SCORE: 0

## 2024-05-08 LAB
BKR LAB AP GYN ADEQUACY: NORMAL
BKR LAB AP GYN INTERPRETATION: NORMAL
BKR LAB AP HPV REFLEX: NORMAL
BKR LAB AP PREVIOUS ABNORMAL: NORMAL
PATH REPORT.COMMENTS IMP SPEC: NORMAL
PATH REPORT.COMMENTS IMP SPEC: NORMAL
PATH REPORT.RELEVANT HX SPEC: NORMAL

## 2024-05-13 DIAGNOSIS — Z30.41 ENCOUNTER FOR SURVEILLANCE OF CONTRACEPTIVE PILLS: ICD-10-CM

## 2024-05-13 RX ORDER — NORGESTIMATE AND ETHINYL ESTRADIOL 7DAYSX3 28
1 KIT ORAL DAILY
Qty: 28 TABLET | Refills: 0 | OUTPATIENT
Start: 2024-05-13

## 2024-05-13 NOTE — TELEPHONE ENCOUNTER
Requested Prescriptions   Pending Prescriptions Disp Refills    TRI-SPRINTEC 0.18/0.215/0.25 MG-35 MCG tablet [Pharmacy Med Name: TRI-SPRINTEC TABLETS 28S] 28 tablet 0     Sig: TAKE 1 TABLET BY MOUTH DAILY       Contraceptives Protocol Failed - 5/13/2024  3:12 AM        Failed - Medication indicated for associated diagnosis     Medication is associated with one or more of the following diagnoses:  Hyperthyroidism  Disorder of the thyroid gland  Thyrotoxicosis  Thyroid storm  Thyroid eye disease          Passed - Patient is not a current smoker if age is 35 or older        Passed - Recent (12 mo) or future (30 days) visit within the authorizing provider's specialty     The patient must have completed an in-person or virtual visit within the past 12 months or has a future visit scheduled within the next 90 days with the authorizing provider s specialty.  Urgent care and e-visits do not quality as an office visit for this protocol.          Passed - Medication is active on med list        Passed - No active pregnancy on record        Passed - No positive pregnancy test in past 12 months           Last Written Prescription Date:  3/25/24  Last Fill Quantity: 28,  # refills: 0   Last office visit: 5/3/2024 ; last virtual visit: Visit date not found with prescribing provider:  Adonis   Future Office Visit:  NONE    - patient willing to trial discontinuation of CHCs, will complete current pack and then stop medication  - discussed use of condoms for contraception as unknown if menopausal     Refill request refused due to :   Medication discontinued.  See note above       Carrie Garza RN on 5/13/2024 at 6:54 AM

## 2024-06-04 ENCOUNTER — TELEPHONE (OUTPATIENT)
Dept: GASTROENTEROLOGY | Facility: CLINIC | Age: 54
End: 2024-06-04
Payer: COMMERCIAL

## 2024-06-04 DIAGNOSIS — Z12.11 SCREEN FOR COLON CANCER: Primary | ICD-10-CM

## 2024-06-04 NOTE — TELEPHONE ENCOUNTER
"Endoscopy Scheduling Screen    Have you had a positive Covid test in the last 14 days?  No    What is your communication preference for Instructions and/or Bowel Prep?   MyChart    What insurance is in the chart?  Other:  BC[employer]    Ordering/Referring Provider: SHAILA JUAN   (If ordering provider performs procedure, schedule with ordering provider unless otherwise instructed. )    BMI: Estimated body mass index is 32.68 kg/m  as calculated from the following:    Height as of 5/3/24: 1.662 m (5' 5.43\").    Weight as of 5/3/24: 90.3 kg (199 lb).     Sedation Ordered  moderate sedation.   If patient BMI > 50 do not schedule in [facility].    If patient BMI > 45 do not schedule at [facility].    Are you taking methadone or Suboxone?  No    Have you had difficulties, pain, or discomfort during past endoscopy procedures?  No    Are you taking any prescription medications for pain 3 or more times per week?   NO, No RN review required.    Do you have a history of malignant hyperthermia?  No    (Females) Are you currently pregnant?   No     Have you been diagnosed or told you have pulmonary hypertension?   No    Do you have an LVAD?  No    Have you been told you have moderate to severe sleep apnea?  No    Have you been told you have COPD, asthma, or any other lung disease?  Yes     What breathing problems do you have?  Asthma - allergy induced    Do you use home oxygen?  No    Have your breathing problems required an ED visit or hospitalization in the last year?  No    Do you have any heart conditions?  No     Have you ever had or are you waiting for an organ transplant?  No. Continue scheduling, no site restrictions.    Have you had a stroke or transient ischemic attack (TIA aka \"mini stroke\" in the last 6 months?   No    Have you been diagnosed with or been told you have cirrhosis of the liver?   No    Are you currently on dialysis?   No    Do you need assistance transferring?   No    BMI: Estimated body mass index is 32.68 kg/m  " "as calculated from the following:    Height as of 5/3/24: 1.662 m (5' 5.43\").    Weight as of 5/3/24: 90.3 kg (199 lb).     Is patients BMI > 40 and scheduling location UP?  No    Do you take an injectable medication for weight loss or diabetes (excluding insulin)?  Yes, hold time can be up to 7 days. Please consult with you prescribing provider to discuss endoscopy recommendations.     Do you take the medication Naltrexone?  No    Do you take blood thinners?  No       Prep   Are you currently on dialysis or do you have chronic kidney disease?  No    Do you have a diagnosis of diabetes?  No    Do you have a diagnosis of cystic fibrosis (CF)?  No    On a regular basis do you go 3 -5 days between bowel movements?  No    BMI > 40?  No    Preferred Pharmacy:    [id number] [place] [address] AT [facility] OF  [address]  [address]  [facility] [place] [address]  Phone: [phone] Fax: [phone]      Final Scheduling Details     Procedure scheduled  Colonoscopy    Surgeon:  ARABELLA     Date of procedure:  8/1/2024     Pre-OP / PAC:   No - Not required for this site.    Location  [facility] - [facility] - Per order.    Sedation   Moderate Sedation - Per order.      Patient Reminders:   You will receive a call from a Nurse to review instructions and health history.  This assessment must be completed prior to your procedure.  Failure to complete the Nurse assessment may result in the procedure being cancelled.      On the day of your procedure, please designate an adult(s) who can drive you home stay with you for the next 24 hours. The medicines used in the exam will make you sleepy. You will not be able to drive.      You cannot take public transportation, ride share services, or non-medical taxi service without a responsible caregiver.  Medical transport services are allowed with the requirement that a responsible caregiver will receive you at your destination.  We require that drivers and " caregivers are confirmed prior to your procedure.

## 2024-07-03 RX ORDER — BISACODYL 5 MG/1
TABLET, DELAYED RELEASE ORAL
Qty: 4 TABLET | Refills: 0 | Status: SHIPPED | OUTPATIENT
Start: 2024-07-03

## 2024-07-03 RX ORDER — POLYETHYLENE GLYCOL 3350 17 G/17G
POWDER, FOR SOLUTION ORAL
Qty: 238 G | Refills: 0 | Status: SHIPPED | OUTPATIENT
Start: 2024-07-03

## 2024-07-03 NOTE — TELEPHONE ENCOUNTER
Low volume Extended Golytely Bowel Prep  recommended due to GLP-1 agonist medication noted in chart.  Instructions were sent via [employer]. Bowel prep was sent 7/3/2024 to [id number] - [place], [place] - [address] [place] [address] AT [facility] OF  & [place] [address].

## 2024-07-11 DIAGNOSIS — N95.1 VASOMOTOR SYMPTOMS DUE TO MENOPAUSE: Primary | ICD-10-CM

## 2024-07-11 PROCEDURE — 99207 PR NO CHARGE LOS: CPT

## 2024-07-11 RX ORDER — PROGESTERONE 100 MG/1
100 CAPSULE ORAL DAILY
Qty: 90 CAPSULE | Refills: 3 | Status: SHIPPED | OUTPATIENT
Start: 2024-07-11

## 2024-07-11 RX ORDER — ESTRADIOL 0.03 MG/D
1 PATCH TRANSDERMAL WEEKLY
Qty: 4 PATCH | Refills: 12 | Status: SHIPPED | OUTPATIENT
Start: 2024-07-11 | End: 2024-08-05 | Stop reason: ALTCHOICE

## 2024-07-11 NOTE — PROGRESS NOTES
Reviewed the WHI and the more recent extension data after 20 yrs since the WHI on the pros/cons, as well as short and long term safety of HRT.    Reviewed the studied population in the WHI and the indication for start of HRT and the years out from menopause in that group vs how HRT would typically be used.    Reviewed recommendations for lowest dose and shortest amount of time possible for HRT but also within the parameters of QOL.    Reviewed QOL and impacts of v.m sx, and other perimenopausal/menopausal sx, vs risk of breast cancer of 0.48% increase over baseline, per year, in women on HRT vs 0.4% on ERT alone.    Discussed risks for hypercoagulability and increased risk of stroke, VTE, and CVD.     Patient interested in transdermal therapy. Discussed weekly application, avoid areas of friction and frequent sun exposure.     Rx Climera 0.025 mg weekly, prometrium 100mg daily. Will adjust as needed based on patient symptoms.    Rebecca Sheikh PA-C

## 2024-07-24 ENCOUNTER — TELEPHONE (OUTPATIENT)
Dept: GASTROENTEROLOGY | Facility: CLINIC | Age: 54
End: 2024-07-24
Payer: COMMERCIAL

## 2024-07-24 NOTE — TELEPHONE ENCOUNTER
Attempted to contact patient in order to complete pre assessment questions.     No answer. Left message to return call to [phone] option 4    Callback communication sent via Tabtor.    --------------------------------------------------------------------------------------------------------------------      Pre visit planning completed.      Procedure details:    Patient scheduled for Colonoscopy on 8/1/24.     Arrival time: 0745. Procedure time 0830    Facility location: [facility]; [address] Floor, [place] [address]. Check in location: 2nd Floor at Surgery desk.    Sedation type: Conscious sedation   -- had CS for 2021 colonoscopy , tolerated well    Pre op exam needed? No.    Indication for procedure:      History of Polyps         Chart review:     Electronic implanted devices? No    Recent diagnosis of diverticulitis within the last 6 weeks? No    Diabetic? No      Medication review:    Anticoagulants? No    NSAIDS? No NSAID medications per patient's medication list.  RN will verify with pre-assessment call.    Other medication HOLDING recommendations:  Weight loss medication/injectable: Ozempic (Semaglutide). Weekly dosing of medication.  Hold 7 days before procedure.  Follow up with managing provider.       Prep for procedure:     Bowel prep recommendation: Low Volume Extended Golytely. Bowel prep prescription sent to [id number] - [place], [place] - [address] AT [facility] [address]  [address]   Due to: GLP-1 agonist medication noted in chart.     Prep instructions sent via Tabtor         Corinne Kliber, RN  Endoscopy Procedure Pre Assessment RN  [phone] option 4

## 2024-07-24 NOTE — TELEPHONE ENCOUNTER
Pre assessment completed for upcoming procedure.   (Please see previous telephone encounter notes for complete details)    Patient  returned call.       Procedure details:    Arrival time and facility location reviewed.    Pre op exam needed? No.    Designated  policy reviewed. Instructed to have someone stay 6  hours post procedure.       Medication review:    Medications reviewed. Please see supporting documentation below. Holding recommendations discussed (if applicable).   Injectable diabetic medication(s): Ozempic (Semaglutide). Weekly dosing of medication.  Hold 7 days before procedure.  Follow up with managing provider.       Prep for procedure:     Patient stated they have already reviewed the bowel prep instructions and writer answered all patient questions (if applicable). NPO instructions reviewed.    Patient was instructed to call if any questions or concerns arise.        Any additional information needed:  N/A      Patient  verbalized understanding and had no questions or concerns at this time.      Katy Robin RN  Endoscopy Procedure Pre Assessment   [phone] option 4

## 2024-07-28 ENCOUNTER — MYC MEDICAL ADVICE (OUTPATIENT)
Dept: OBGYN | Facility: CLINIC | Age: 54
End: 2024-07-28
Payer: COMMERCIAL

## 2024-07-28 DIAGNOSIS — N95.1 VASOMOTOR SYMPTOMS DUE TO MENOPAUSE: Primary | ICD-10-CM

## 2024-08-01 ENCOUNTER — HOSPITAL ENCOUNTER (OUTPATIENT)
Facility: AMBULATORY SURGERY CENTER | Age: 54
Discharge: HOME | End: 2024-08-01
Attending: COLON & RECTAL SURGERY | Admitting: COLON & RECTAL SURGERY
Payer: COMMERCIAL

## 2024-08-01 VITALS
BODY MASS INDEX: 30.05 KG/M2 | OXYGEN SATURATION: 99 % | DIASTOLIC BLOOD PRESSURE: 74 MMHG | HEART RATE: 72 BPM | WEIGHT: 183 LBS | TEMPERATURE: 97 F | SYSTOLIC BLOOD PRESSURE: 126 MMHG | RESPIRATION RATE: 16 BRPM

## 2024-08-01 LAB — COLONOSCOPY: NORMAL

## 2024-08-01 PROCEDURE — G8907 PT DOC NO EVENTS ON DISCHARG: HCPCS

## 2024-08-01 PROCEDURE — 45378 DIAGNOSTIC COLONOSCOPY: CPT

## 2024-08-01 PROCEDURE — G8918 PT W/O PREOP ORDER IV AB PRO: HCPCS

## 2024-08-01 RX ORDER — NALOXONE HYDROCHLORIDE 0.4 MG/ML
0.2 INJECTION, SOLUTION INTRAMUSCULAR; INTRAVENOUS; SUBCUTANEOUS
Status: DISCONTINUED | OUTPATIENT
Start: 2024-08-01 | End: 2024-08-02 | Stop reason: HOSPADM

## 2024-08-01 RX ORDER — NALOXONE HYDROCHLORIDE 0.4 MG/ML
0.4 INJECTION, SOLUTION INTRAMUSCULAR; INTRAVENOUS; SUBCUTANEOUS
Status: DISCONTINUED | OUTPATIENT
Start: 2024-08-01 | End: 2024-08-02 | Stop reason: HOSPADM

## 2024-08-01 RX ORDER — ONDANSETRON 2 MG/ML
4 INJECTION INTRAMUSCULAR; INTRAVENOUS
Status: DISCONTINUED | OUTPATIENT
Start: 2024-08-01 | End: 2024-08-02 | Stop reason: HOSPADM

## 2024-08-01 RX ORDER — ONDANSETRON 2 MG/ML
4 INJECTION INTRAMUSCULAR; INTRAVENOUS EVERY 6 HOURS PRN
Status: DISCONTINUED | OUTPATIENT
Start: 2024-08-01 | End: 2024-08-02 | Stop reason: HOSPADM

## 2024-08-01 RX ORDER — LIDOCAINE 40 MG/G
CREAM TOPICAL
Status: DISCONTINUED | OUTPATIENT
Start: 2024-08-01 | End: 2024-08-02 | Stop reason: HOSPADM

## 2024-08-01 RX ORDER — FLUMAZENIL 0.1 MG/ML
0.2 INJECTION, SOLUTION INTRAVENOUS
Status: ACTIVE | OUTPATIENT
Start: 2024-08-01 | End: 2024-08-01

## 2024-08-01 RX ORDER — PROCHLORPERAZINE MALEATE 10 MG
10 TABLET ORAL EVERY 6 HOURS PRN
Status: DISCONTINUED | OUTPATIENT
Start: 2024-08-01 | End: 2024-08-02 | Stop reason: HOSPADM

## 2024-08-01 RX ORDER — FENTANYL CITRATE 50 UG/ML
INJECTION, SOLUTION INTRAMUSCULAR; INTRAVENOUS PRN
Status: DISCONTINUED | OUTPATIENT
Start: 2024-08-01 | End: 2024-08-01 | Stop reason: HOSPADM

## 2024-08-01 RX ORDER — ONDANSETRON 4 MG/1
4 TABLET, ORALLY DISINTEGRATING ORAL EVERY 6 HOURS PRN
Status: DISCONTINUED | OUTPATIENT
Start: 2024-08-01 | End: 2024-08-02 | Stop reason: HOSPADM

## 2024-08-01 NOTE — H&P
Pre-Endoscopy History and Physical     Kimberley Carrillo MRN# [id number]   YOB: 1970 Age: 53 year old     Date of Procedure: 08/01/24  Primary care provider: [facility]  Type of Endoscopy: Colonoscopy  Reason for Procedure: surveillance  Type of Anesthesia Anticipated: Moderate Sedation    HPI:    Kimberley is a 53 year old female who will be undergoing the above procedure.      Prior colonoscopy: 3 years ago - polyp found    A history and physical has been performed. The patient's medications and allergies have been reviewed. The risks and benefits of the procedure and the sedation options and risks were discussed with the patient.  All questions were answered and informed consent was obtained.      She denies a personal or family history of anesthesia complications or bleeding disorders. No family history of CRC but reports family history of polyps.     Allergies   Allergen Reactions    Erythromycin Nausea      Allergy to Latex: NO   Allergy to tape: NO   Intolerances: NO     Current Outpatient Medications   Medication Sig Dispense Refill    albuterol (PROAIR HFA, PROVENTIL HFA, VENTOLIN HFA) 108 (90 BASE) MCG/ACT inhaler Inhale 2 puffs into the lungs every 6 hours      betamethasone dipropionate (DIPROSONE) 0.05 % cream   5    bisacodyl (DULCOLAX) 5 MG EC tablet Two days prior to procedure take two (2) tablets at 10am. One day prior to procedure take two (2) tablets at 10am 4 tablet 0    estradiol (FEMPATCH) 0.025 MG/24HR weekly patch Place 1 patch onto the skin once a week 4 patch 12    fluticasone (FLONASE) 50 MCG/ACT spray PLACE 2 SPRAYS INTO BOTH NOSTRILS DAILY.  11    OMEPRAZOLE PO Take 10 mg by mouth daily      polyethylene glycol (GOLYTELY) 236 g suspension One day prior to procedure at 3 pm fill container with water. Cover and shake until mixed well. Drink an 8 oz. glass of mixture every 10-15 minutes until the 1st half of the jug is gone. Place the remainder of  the Golytely in the refrigerator. At 8 pm, drink the 2nd half of the jug of Golytely bowel prep. Drink an 8 oz. glass of Golytely every 10-15 minutes until the container of Golytely is gone. 4000 mL 0    polyethylene glycol (MIRALAX) 17 GM/Dose powder Take 1 capful (17g) of Miralax mixed with 8 oz of a clear liquid twice daily for 7 days prior to your scheduled procedure. 238 g 0    progesterone (PROMETRIUM) 100 MG capsule Take 1 capsule (100 mg) by mouth daily 90 capsule 3    topiramate (TOPAMAX) 25 MG capsule       norgestim-eth estrad triphasic (TRI-SPRINTEC) 0.18/0.215/0.25 MG-35 MCG tablet Take 1 tablet by mouth daily 28 tablet 0    semaglutide (OZEMPIC) 2 MG/1.5ML SOPN pen        Current Facility-Administered Medications   Medication Dose Route Frequency Provider Last Rate Last Admin    lidocaine (LMX4) kit   Topical Q1H PRMarie Hassan MD        lidocaine 1 % 0.1-1 mL  0.1-1 mL Other Q1H Marie Couch MD        ondansetron (ZOFRAN) injection 4 mg  4 mg Intravenous Once PRN Marie Newton MD        sodium chloride (PF) 0.9% PF flush 3 mL  3 mL Intracatheter Q8H Marie Newton MD        sodium chloride (PF) 0.9% PF flush 3 mL  3 mL Intracatheter q1 min prMarie Hassan MD           Patient Active Problem List   Diagnosis    Non morbid obesity        Past Medical History:   Diagnosis Date    Papanicolaou smear of cervix with low grade squamous intraepithelial lesion (LGSIL) 10/26/2011    10/26/11 LSIL/Neg HPV 4/12, 10/12, 4/13, 11/13, 11/14, 11/15, 11/16 NIL paps         Past Surgical History:   Procedure Laterality Date    breast lift      C/SECTION, CLASSICAL      X 2       Social History     Tobacco Use    Smoking status: Never    Smokeless tobacco: Never   Substance Use Topics    Alcohol use: Yes     Alcohol/week: 0.0 standard drinks of alcohol     Comment: .occ       Family History   Problem Relation Age of Onset    Hyperlipidemia Mother     Factor V Leiden  "deficiency Mother     Hyperlipidemia Father     Factor V Leiden deficiency Sister     Factor V Leiden deficiency Brother     Cerebrovascular Disease Paternal Grandfather        REVIEW OF SYSTEMS:     5 point ROS negative except as noted above in HPI, including Gen., Resp., CV, GI &  system review.      PHYSICAL EXAM:   /63   Temp 97  F (36.1  C) (Temporal)   Resp 16   Wt 83 kg (183 lb)   LMP 05/01/2024 (Approximate)   SpO2 99%   BMI 30.05 kg/m   Estimated body mass index is 30.05 kg/m  as calculated from the following:    Height as of 5/3/24: 1.662 m (5' 5.43\").    Weight as of this encounter: 83 kg (183 lb).   All Vitals have been reviewed.    GENERAL APPEARANCE: healthy and alert  MENTAL STATUS: alert  AIRWAY EXAM: Mallampatti Class I (visualization of the soft palate, fauces, uvula, anterior and posterior pillars)  RESP: lungs clear to auscultation - no rales, rhonchi or wheezes  CV: regular rates and rhythm      IMPRESSION   ASA Class 1 - Healthy patient, no medical problems        PLAN:     Plan for colonoscopy. We discussed the risks, benefits and alternatives and the patient wished to proceed.    The above has been forwarded to the consulting provider.    Marie Newton MD, MS, FACS, FASCRS  [facility]  Office: [phone]  8/1/2024 8:20 AM         "

## 2024-08-05 RX ORDER — ESTRADIOL 1 MG/1
0.5 TABLET ORAL DAILY
Qty: 30 TABLET | Refills: 5 | Status: SHIPPED | OUTPATIENT
Start: 2024-08-05 | End: 2024-08-07

## 2024-08-07 DIAGNOSIS — N95.1 VASOMOTOR SYMPTOMS DUE TO MENOPAUSE: ICD-10-CM

## 2024-08-07 NOTE — TELEPHONE ENCOUNTER
Requested Prescriptions   Pending Prescriptions Disp Refills    estradiol (ESTRACE) 1 MG tablet 45 tablet 3     Sig: Take 0.5 tablets (0.5 mg) by mouth daily for 90 days       Contraceptives Protocol Failed - 8/7/2024 10:09 AM        Failed - Medication indicated for associated diagnosis     Medication is associated with one or more of the following diagnoses:  Contraception  Acne  Dysmenorrhea  Menorrhagia  Amenorrhea  PCOS  Premenstrual Dysphoric Disorder  Irregular menses  Endometriosis          Passed - Patient is not a current smoker if age is 35 or older        Passed - Recent (12 mo) or future (30 days) visit within the authorizing provider's specialty     The patient must have completed an in-person or virtual visit within the past 12 months or has a future visit scheduled within the next 90 days with the authorizing provider s specialty.  Urgent care and e-visits do not quality as an office visit for this protocol.          Passed - Medication is active on med list        Passed - No active pregnancy on record        Passed - No positive pregnancy test in past 12 months       Hormone Replacement Therapy Failed - 8/7/2024 10:09 AM        Failed - Medication indicated for associated diagnosis     The medication is prescribed for one or more of the following conditions:    Menopause   Vulva/Vaginal atrophy   Low Estrogen   Gender Dysphoria   Male to Female transgender          Passed - Blood pressure under 140/90 in past 12 months     BP Readings from Last 3 Encounters:   08/01/24 126/74   05/03/24 132/68   01/09/23 118/72       No data recorded            Passed - Medication is active on med list        Passed - Recent (12 mo) or future (90 days) visit within the authorizing provider's specialty     The patient must have completed an in-person or virtual visit within the past 12 months or has a future visit scheduled within the next 90 days with the authorizing provider s specialty.  Urgent care and e-visits  do not quality as an office visit for this protocol.          Passed - Patient is 18 years of age or older        Passed - No active pregnancy on record        Passed - No positive pregnancy test on record in past 12 months       Hormone Replacement Therapy (vaginal) Failed - 8/7/2024 10:09 AM        Failed - Medication indicated for associated diagnosis     Medication is associated with one or more of the following diagnoses:     Menopause   Vulva/Vaginal atrophy   UTI prophylaxis          Passed - Medication is active on med list        Passed - Recent (12 mo) or future (90 days) visit within the authorizing provider's specialty     The patient must have completed an in-person or virtual visit within the past 12 months or has a future visit scheduled within the next 90 days with the authorizing provider s specialty.  Urgent care and e-visits do not quality as an office visit for this protocol.          Passed - Patient is 18 years of age or older        Passed - No active pregnancy on record        Passed - No positive pregnancy test on record in past 12 months          Last Written Prescription Date:  08/05/2024  Last Fill Quantity: 30,  # refills: 5   Last office visit: 5/3/2024 with prescribing provider:  Rebecca Sheikh PA-C   Future Office Visit:  None    *Patient is requesting a 90 day supply

## 2024-08-09 RX ORDER — ESTRADIOL 1 MG/1
0.5 TABLET ORAL DAILY
Qty: 90 TABLET | Refills: 0 | Status: SHIPPED | OUTPATIENT
Start: 2024-08-09 | End: 2024-11-07

## 2025-01-04 ENCOUNTER — HEALTH MAINTENANCE LETTER (OUTPATIENT)
Age: 55
End: 2025-01-04

## 2025-01-23 ENCOUNTER — ANCILLARY PROCEDURE (OUTPATIENT)
Dept: MAMMOGRAPHY | Facility: CLINIC | Age: 55
End: 2025-01-23
Payer: COMMERCIAL

## 2025-01-23 DIAGNOSIS — Z12.31 VISIT FOR SCREENING MAMMOGRAM: ICD-10-CM

## 2025-01-23 PROCEDURE — 77067 SCR MAMMO BI INCL CAD: CPT | Mod: TC | Performed by: STUDENT IN AN ORGANIZED HEALTH CARE EDUCATION/TRAINING PROGRAM

## 2025-01-23 PROCEDURE — 77063 BREAST TOMOSYNTHESIS BI: CPT | Mod: TC | Performed by: STUDENT IN AN ORGANIZED HEALTH CARE EDUCATION/TRAINING PROGRAM

## 2025-05-23 SDOH — HEALTH STABILITY: PHYSICAL HEALTH: ON AVERAGE, HOW MANY DAYS PER WEEK DO YOU ENGAGE IN MODERATE TO STRENUOUS EXERCISE (LIKE A BRISK WALK)?: 4 DAYS

## 2025-05-23 SDOH — HEALTH STABILITY: PHYSICAL HEALTH: ON AVERAGE, HOW MANY MINUTES DO YOU ENGAGE IN EXERCISE AT THIS LEVEL?: 30 MIN

## 2025-05-23 ASSESSMENT — SOCIAL DETERMINANTS OF HEALTH (SDOH): HOW OFTEN DO YOU GET TOGETHER WITH FRIENDS OR RELATIVES?: TWICE A WEEK

## 2025-05-28 ENCOUNTER — OFFICE VISIT (OUTPATIENT)
Dept: FAMILY MEDICINE | Facility: CLINIC | Age: 55
End: 2025-05-28
Payer: COMMERCIAL

## 2025-05-28 VITALS
WEIGHT: 171.3 LBS | SYSTOLIC BLOOD PRESSURE: 119 MMHG | TEMPERATURE: 97.9 F | HEIGHT: 65 IN | DIASTOLIC BLOOD PRESSURE: 80 MMHG | BODY MASS INDEX: 28.54 KG/M2 | RESPIRATION RATE: 16 BRPM | OXYGEN SATURATION: 100 % | HEART RATE: 67 BPM

## 2025-05-28 DIAGNOSIS — J45.20 MILD INTERMITTENT ASTHMA WITHOUT COMPLICATION: ICD-10-CM

## 2025-05-28 DIAGNOSIS — L30.9 ECZEMA, UNSPECIFIED TYPE: ICD-10-CM

## 2025-05-28 DIAGNOSIS — E66.3 OVERWEIGHT (BMI 25.0-29.9): ICD-10-CM

## 2025-05-28 DIAGNOSIS — Z13.6 SCREENING FOR CARDIOVASCULAR CONDITION: ICD-10-CM

## 2025-05-28 DIAGNOSIS — G89.29 CHRONIC RIGHT SHOULDER PAIN: ICD-10-CM

## 2025-05-28 DIAGNOSIS — Z13.1 SCREENING FOR DIABETES MELLITUS: ICD-10-CM

## 2025-05-28 DIAGNOSIS — Z23 NEED FOR VACCINATION: ICD-10-CM

## 2025-05-28 DIAGNOSIS — M25.511 CHRONIC RIGHT SHOULDER PAIN: ICD-10-CM

## 2025-05-28 DIAGNOSIS — Z79.890 HORMONE REPLACEMENT THERAPY: ICD-10-CM

## 2025-05-28 DIAGNOSIS — Z00.00 ANNUAL PHYSICAL EXAM: Primary | ICD-10-CM

## 2025-05-28 LAB
ALBUMIN SERPL BCG-MCNC: 4.1 G/DL (ref 3.5–5.2)
ALP SERPL-CCNC: 63 U/L (ref 40–150)
ALT SERPL W P-5'-P-CCNC: 16 U/L (ref 0–50)
ANION GAP SERPL CALCULATED.3IONS-SCNC: 10 MMOL/L (ref 7–15)
AST SERPL W P-5'-P-CCNC: 21 U/L (ref 0–45)
BILIRUB SERPL-MCNC: 0.6 MG/DL
BUN SERPL-MCNC: 13.1 MG/DL (ref 6–20)
CALCIUM SERPL-MCNC: 9.3 MG/DL (ref 8.8–10.4)
CHLORIDE SERPL-SCNC: 105 MMOL/L (ref 98–107)
CHOLEST SERPL-MCNC: 218 MG/DL
CREAT SERPL-MCNC: 0.86 MG/DL (ref 0.51–0.95)
EGFRCR SERPLBLD CKD-EPI 2021: 81 ML/MIN/1.73M2
ERYTHROCYTE [DISTWIDTH] IN BLOOD BY AUTOMATED COUNT: 12.2 % (ref 10–15)
EST. AVERAGE GLUCOSE BLD GHB EST-MCNC: 94 MG/DL
FASTING STATUS PATIENT QL REPORTED: NO
FASTING STATUS PATIENT QL REPORTED: NO
GLUCOSE SERPL-MCNC: 102 MG/DL (ref 70–99)
HBA1C MFR BLD: 4.9 % (ref 0–5.6)
HCO3 SERPL-SCNC: 22 MMOL/L (ref 22–29)
HCT VFR BLD AUTO: 42 % (ref 35–47)
HDLC SERPL-MCNC: 77 MG/DL
HGB BLD-MCNC: 14.3 G/DL (ref 11.7–15.7)
LDLC SERPL CALC-MCNC: 114 MG/DL
MCH RBC QN AUTO: 31 PG (ref 26.5–33)
MCHC RBC AUTO-ENTMCNC: 34 G/DL (ref 31.5–36.5)
MCV RBC AUTO: 91 FL (ref 78–100)
NONHDLC SERPL-MCNC: 141 MG/DL
PLATELET # BLD AUTO: 281 10E3/UL (ref 150–450)
POTASSIUM SERPL-SCNC: 4.2 MMOL/L (ref 3.4–5.3)
PROT SERPL-MCNC: 7.2 G/DL (ref 6.4–8.3)
RBC # BLD AUTO: 4.62 10E6/UL (ref 3.8–5.2)
SODIUM SERPL-SCNC: 137 MMOL/L (ref 135–145)
TRIGL SERPL-MCNC: 136 MG/DL
WBC # BLD AUTO: 5.2 10E3/UL (ref 4–11)

## 2025-05-28 PROCEDURE — 90471 IMMUNIZATION ADMIN: CPT

## 2025-05-28 PROCEDURE — 83036 HEMOGLOBIN GLYCOSYLATED A1C: CPT

## 2025-05-28 PROCEDURE — G2211 COMPLEX E/M VISIT ADD ON: HCPCS

## 2025-05-28 PROCEDURE — 80061 LIPID PANEL: CPT

## 2025-05-28 PROCEDURE — 90746 HEPB VACCINE 3 DOSE ADULT IM: CPT

## 2025-05-28 PROCEDURE — 3079F DIAST BP 80-89 MM HG: CPT

## 2025-05-28 PROCEDURE — 99386 PREV VISIT NEW AGE 40-64: CPT | Mod: 25

## 2025-05-28 PROCEDURE — 80053 COMPREHEN METABOLIC PANEL: CPT

## 2025-05-28 PROCEDURE — 36415 COLL VENOUS BLD VENIPUNCTURE: CPT

## 2025-05-28 PROCEDURE — 99213 OFFICE O/P EST LOW 20 MIN: CPT | Mod: 25

## 2025-05-28 PROCEDURE — 3074F SYST BP LT 130 MM HG: CPT

## 2025-05-28 PROCEDURE — 85027 COMPLETE CBC AUTOMATED: CPT

## 2025-05-28 PROCEDURE — 3044F HG A1C LEVEL LT 7.0%: CPT

## 2025-05-28 PROCEDURE — 1126F AMNT PAIN NOTED NONE PRSNT: CPT

## 2025-05-28 RX ORDER — ALBUTEROL SULFATE 90 UG/1
2 INHALANT RESPIRATORY (INHALATION) EVERY 6 HOURS PRN
Qty: 18 G | Refills: 3 | Status: SHIPPED | OUTPATIENT
Start: 2025-05-28

## 2025-05-28 RX ORDER — BETAMETHASONE DIPROPIONATE 0.5 MG/G
CREAM TOPICAL DAILY
Qty: 45 G | Refills: 5 | Status: SHIPPED | OUTPATIENT
Start: 2025-05-28

## 2025-05-28 ASSESSMENT — PAIN SCALES - GENERAL: PAINLEVEL_OUTOF10: NO PAIN (0)

## 2025-05-28 NOTE — PROGRESS NOTES
Preventive Care Visit  [facility] SUSIE Peralta DNP, Geriatric Medicine  May 28, 2025      Assessment & Plan     Annual physical exam  UTD on pap (done 2024), mammogram, and colonoscopy (due 2029).  Will update labs and vaccines today  - CBC with platelets  - Comprehensive metabolic panel    Chronic right shoulder pain  3 to 4-month history of right shoulder pain, recommend PT with Ortho follow-up for further evaluation of symptoms  - Orthopedic  Referral; Future  - Physical Therapy  Referral; Future    Overweight (BMI 25.0-29.9)  Reports 60 pound weight loss since last year since starting on semaglutide.  Follows through weight management clinic through [facility]  - Comprehensive metabolic panel    Eczema, unspecified type  Intermittently gets flares to elbows and behind knees, uses topical cream as needed so will provide refill per request  - betamethasone dipropionate (DIPROSONE) 0.05 % external cream; Apply topically daily.    Mild intermittent asthma without complication  Reports asthma generally well-controlled, uses albuterol as needed a couple times a month. Will provide albuterol refill to have available   - albuterol (PROAIR HFA/PROVENTIL HFA/VENTOLIN HFA) 108 (90 Base) MCG/ACT inhaler; Inhale 2 puffs into the lungs every 6 hours as needed for shortness of breath, wheezing or cough.    Hormone replacement therapy  On estrogen and progesterone through OB/GYN, menopause approximately 1 year ago.  Feels HRT has been helpful in managing her hot flashes    Screening for cardiovascular condition  - Lipid panel reflex to direct LDL Non-fasting    Screening for diabetes mellitus  - Hemoglobin A1c    Need for vaccination  - HEPATITIS B, ADULT 20+ (ENGERIX-B/RECOMBIVAX HB)    Patient has been advised of split billing requirements and indicates understanding: Yes        BMI  Estimated body mass index is 28.13 kg/m  as calculated from the following:    Height as of this  "encounter: 1.662 m (5' 5.43\").    Weight as of this encounter: 77.7 kg (171 lb 4.8 oz).   Weight management plan: Patient referred to endocrine and/or weight management specialty Discussed healthy diet and exercise guidelines    Counseling  Appropriate preventive services were addressed with this patient via screening, questionnaire, or discussion as appropriate for fall prevention, nutrition, physical activity, Tobacco-use cessation, social engagement, weight loss and cognition.  Checklist reviewing preventive services available has been given to the patient.  Reviewed patient's diet, addressing concerns and/or questions.           Ulisses Chu is a 54 year old, presenting for the following:  Physical and Establish Care        5/28/2025     7:27 AM   Additional Questions   Roomed by Deidre SANCHEZ MA          Here for physical, no acute concerns  On HRT through OBGYN - menopause approximately 1 year ago  Notes 3 to 4-month history of chronic right shoulder pain, does not recall any specific injury but carries work bag on that shoulder and wonders if related to overuse. Crossing arm and going overhead causes most discomfort  Has been on Weygovy ~1 year through [facility] weight loss clinic. Reports ~60# weight loss since starting  Family hx of factor 5 deficiency, reports has had negative workup               Advance Care Planning    Discussed advance care planning with patient; however, patient declined at this time.        5/23/2025   General Health   How would you rate your overall physical health? Good   Feel stress (tense, anxious, or unable to sleep) Only a little   (!) STRESS CONCERN      5/23/2025   Nutrition   Three or more servings of calcium each day? Yes   Diet: Regular (no restrictions)   How many servings of fruit and vegetables per day? (!) 2-3   How many sweetened beverages each day? 0-1         5/23/2025   Exercise   Days per week of moderate/strenous exercise 4 days   Average minutes spent " exercising at this level 30 min         5/23/2025   Social Factors   Frequency of gathering with friends or relatives Twice a week   Worry food won't last until get money to buy more No   Food not last or not have enough money for food? No   Do you have housing? (Housing is defined as stable permanent housing and does not include staying outside in a car, in a tent, in an abandoned building, in an overnight shelter, or couch-surfing.) Yes   Are you worried about losing your housing? No   Lack of transportation? No   Unable to get utilities (heat,electricity)? No         5/23/2025   Fall Risk   Fallen 2 or more times in the past year? No   Trouble with walking or balance? No          5/23/2025   Dental   Dentist two times every year? Yes         Today's PHQ-2 Score:       5/27/2025     9:07 AM   PHQ-2 ( 1999 Pfizer)   Q1: Little interest or pleasure in doing things 0   Q2: Feeling down, depressed or hopeless 0   PHQ-2 Score 0    Q1: Little interest or pleasure in doing things Not at all   Q2: Feeling down, depressed or hopeless Not at all   PHQ-2 Score 0       Patient-reported           5/23/2025   Substance Use   Alcohol more than 3/day or more than 7/wk No   Do you use any other substances recreationally? No     Social History     Tobacco Use    Smoking status: Never    Smokeless tobacco: Never   Vaping Use    Vaping status: Never Used   Substance Use Topics    Alcohol use: Yes     Comment: .occ    Drug use: Never           1/23/2025   LAST FHS-7 RESULTS   1st degree relative breast or ovarian cancer No   Any relative bilateral breast cancer No   Any male have breast cancer No   Any ONE woman have BOTH breast AND ovarian cancer Yes   Any woman with breast cancer before 50yrs No   2 or more relatives with breast AND/OR ovarian cancer No   2 or more relatives with breast AND/OR bowel cancer No        Mammogram Screening - Mammogram every 1-2 years updated in Health Maintenance based on mutual decision making         5/23/2025   STI Screening   New sexual partner(s) since last STI/HIV test? No     History of abnormal Pap smear: YES - reflected in Problem List and Health Maintenance accordingly        Latest Ref Rng & Units 5/3/2024     9:52 AM 12/7/2021     3:00 PM 12/1/2020     3:50 PM   PAP / HPV   PAP  Negative for Intraepithelial Lesion or Malignancy (NILM)  Negative for Intraepithelial Lesion or Malignancy (NILM)     HPV 16 DNA Negative Negative  Negative  Negative    HPV 18 DNA Negative Negative  Negative  Negative    Other HR HPV Negative Negative  Negative  Negative      ASCVD Risk   The ASCVD Risk score (Kathe MILLER, et al., 2019) failed to calculate for the following reasons:    Cannot find a previous HDL lab    Cannot find a previous total cholesterol lab           Reviewed and updated as needed this visit by Provider   Tobacco   Meds  Problems  Med Hx  Surg Hx  Fam Hx  Soc Hx Sexual   Activity          Past Medical History:   Diagnosis Date    Hypertension     Papanicolaou smear of cervix with low grade squamous intraepithelial lesion (LGSIL) 10/26/2011    10/26/11 LSIL/Neg HPV 4/12, 10/12, 4/13, 11/13, 11/14, 11/15, 11/16 NIL paps     Uncomplicated asthma      Past Surgical History:   Procedure Laterality Date    ABDOMEN SURGERY      2 C sections    breast lift      BREAST SURGERY      Reduction    C/SECTION, CLASSICAL      X 2    COLONOSCOPY  2024    COLONOSCOPY WITH CO2 INSUFFLATION N/A 08/01/2024    Procedure: Colonoscopy with CO2 insufflation;  Surgeon: Marie Newton MD;  Location: [facility] OR    COSMETIC SURGERY  2008    SOFT TISSUE SURGERY  2023    Trigger finger     Lab work is in process  BP Readings from Last 3 Encounters:   05/28/25 119/80   08/01/24 126/74   05/03/24 132/68    Wt Readings from Last 3 Encounters:   05/28/25 77.7 kg (171 lb 4.8 oz)   08/01/24 83 kg (183 lb)   05/03/24 90.3 kg (199 lb)                      Review of Systems  Constitutional, HEENT, cardiovascular, pulmonary,  "gi and gu systems are negative, except as otherwise noted.     Objective    Exam  /80 (BP Location: Right arm, Patient Position: Sitting, Cuff Size: Adult Regular)   Pulse 67   Temp 97.9  F (36.6  C) (Oral)   Resp 16   Ht 1.662 m (5' 5.43\")   Wt 77.7 kg (171 lb 4.8 oz)   LMP 05/01/2024 (Approximate)   SpO2 100%   BMI 28.13 kg/m     Estimated body mass index is 28.13 kg/m  as calculated from the following:    Height as of this encounter: 1.662 m (5' 5.43\").    Weight as of this encounter: 77.7 kg (171 lb 4.8 oz).    Physical Exam  Vitals reviewed.   HENT:      Head: Normocephalic.   Eyes:      Pupils: Pupils are equal, round, and reactive to light.   Cardiovascular:      Rate and Rhythm: Normal rate and regular rhythm.      Pulses: Normal pulses.      Heart sounds: Normal heart sounds. No murmur heard.  Pulmonary:      Effort: Pulmonary effort is normal. No respiratory distress.      Breath sounds: Normal breath sounds. No wheezing, rhonchi or rales.   Chest:   Breasts:     Right: Normal.      Left: Normal.   Abdominal:      General: Bowel sounds are normal. There is no distension.      Palpations: Abdomen is soft. There is no mass.      Tenderness: There is no abdominal tenderness.   Musculoskeletal:      Right shoulder: Decreased range of motion.      Cervical back: Normal range of motion and neck supple.      Right lower leg: No edema.      Left lower leg: No edema.   Skin:     General: Skin is warm and dry.   Neurological:      Mental Status: She is alert and oriented to person, place, and time.   Psychiatric:         Mood and Affect: Mood normal.         Behavior: Behavior normal.               Signed Electronically by: Lavinia Peralta, KAITLYNN, APRN, CNP    "

## 2025-05-29 ENCOUNTER — TELEPHONE (OUTPATIENT)
Dept: FAMILY MEDICINE | Facility: CLINIC | Age: 55
End: 2025-05-29
Payer: COMMERCIAL

## 2025-05-29 ENCOUNTER — RESULTS FOLLOW-UP (OUTPATIENT)
Dept: FAMILY MEDICINE | Facility: CLINIC | Age: 55
End: 2025-05-29

## 2025-05-29 ENCOUNTER — PATIENT OUTREACH (OUTPATIENT)
Dept: CARE COORDINATION | Facility: CLINIC | Age: 55
End: 2025-05-29
Payer: COMMERCIAL

## 2025-05-29 DIAGNOSIS — L30.9 ECZEMA, UNSPECIFIED TYPE: Primary | ICD-10-CM

## 2025-05-29 RX ORDER — TRIAMCINOLONE ACETONIDE 1 MG/G
CREAM TOPICAL 2 TIMES DAILY
Qty: 80 G | Refills: 1 | Status: SHIPPED | OUTPATIENT
Start: 2025-05-29

## 2025-05-29 NOTE — RESULT ENCOUNTER NOTE
Denver Chu!    Good news - labs look great. Some elevated cholesterol levels but nothing worrisome. Please continue to eat a well-balanced diet lower in saturated fats/oils and engage in regular physical activity.     Let me know if you have any questions or concerns,     Lavinia Peralta, DNP, APRN, CNP

## 2025-05-29 NOTE — TELEPHONE ENCOUNTER
Note from pharm:   (betamethasone dipropionate (DIPROSONE) 0.05 % external cream DRUG NOT COVERED BY PT PLAN. ALTERNATIVE IS: TRIAMCINOLON CRM, BETADIPROP)

## 2025-06-02 ENCOUNTER — PATIENT OUTREACH (OUTPATIENT)
Dept: CARE COORDINATION | Facility: CLINIC | Age: 55
End: 2025-06-02
Payer: COMMERCIAL

## 2025-06-09 ENCOUNTER — ANCILLARY PROCEDURE (OUTPATIENT)
Dept: GENERAL RADIOLOGY | Facility: CLINIC | Age: 55
End: 2025-06-09
Attending: STUDENT IN AN ORGANIZED HEALTH CARE EDUCATION/TRAINING PROGRAM
Payer: COMMERCIAL

## 2025-06-09 ENCOUNTER — OFFICE VISIT (OUTPATIENT)
Dept: ORTHOPEDICS | Facility: CLINIC | Age: 55
End: 2025-06-09
Payer: COMMERCIAL

## 2025-06-09 VITALS — HEIGHT: 65 IN | BODY MASS INDEX: 28.49 KG/M2 | WEIGHT: 171 LBS

## 2025-06-09 DIAGNOSIS — G89.29 CHRONIC RIGHT SHOULDER PAIN: ICD-10-CM

## 2025-06-09 DIAGNOSIS — M25.511 CHRONIC RIGHT SHOULDER PAIN: Primary | ICD-10-CM

## 2025-06-09 DIAGNOSIS — M75.41 SUBACROMIAL IMPINGEMENT OF RIGHT SHOULDER: ICD-10-CM

## 2025-06-09 DIAGNOSIS — G89.29 CHRONIC RIGHT SHOULDER PAIN: Primary | ICD-10-CM

## 2025-06-09 DIAGNOSIS — M25.511 CHRONIC RIGHT SHOULDER PAIN: ICD-10-CM

## 2025-06-09 DIAGNOSIS — M75.30 CALCIFIC SUPRASPINATUS TENDONITIS: ICD-10-CM

## 2025-06-09 PROCEDURE — 99204 OFFICE O/P NEW MOD 45 MIN: CPT | Performed by: STUDENT IN AN ORGANIZED HEALTH CARE EDUCATION/TRAINING PROGRAM

## 2025-06-09 PROCEDURE — 73030 X-RAY EXAM OF SHOULDER: CPT | Mod: TC | Performed by: RADIOLOGY

## 2025-06-09 NOTE — PATIENT INSTRUCTIONS
Kimberley Carrillo, It was nice to see you in our office today.      DIAGNOSIS:   1. Chronic right shoulder pain    2. Subacromial impingement of right shoulder    3. Supraspinatus tendonitis, right        INSTRUCTIONS FOR FOLLOW-UP CARE:  Activity Modification: Activity as tolerated being guided by pain the following simple  Traffic Light System  as it relates to tendon pain:  Green Light (0-3/10 pain): No increases in symptoms, you are OK to continue the activity, or perhaps increase load slightly.  Yellow light (4-6/10 pain): Minor increase in symptoms, but you can move normally within an hour of exercise, and pain reduces to normal within the next 24 hours. Proceed with caution, you can do minor bouts of loading, but too much will aggravate your symptoms and increase pain.    Red light (7-10/10 pain): Cease activity, as you have exceeded your tolerance. Generally, involves a significant increase in pain, which may not settle in the following 24 hours. Rest for 24-48 hours, allow symptoms to settle down, then begin gentle movement, and monitor your progression.    The goal is to try and find the right amount of activity, and increase slowly, allowing your tendon to adapt as you improve.   [ ] Supplements   -Mixed evidence that  Hydrolyzed Collagen may be beneficial.  Imaging/Tests: X-rays reviewed  Rehabilitation: Physical therapy recommended, referral placed  Medication: -----Topical Medication Options:  For night time pain, consider nightly Salonpas patches nightly (on for 12 hours and off for 12 hours)      --Oral medication options: For brief periods as needed for pain and swelling, may take Tylenol 500-1000mg (up to three times per day) and/or ibuprofen 600mg (up to three times per day) as needed. Always take ibuprofen with food.     Interventions: None at this time, consider corticosteroid injection if symptoms persist  Follow-up Plan: As needed after 2-3 physical therapy sessions if symptoms persist    CLINIC  LOCATIONS:     [place]:  [phone]    [address], [address] MYCHART HELP LINE: [phone]   [place] [address] TRIAGE LINE: [phone]   (Monday & Friday) APPOINTMENTS: [phone]    RADIOLOGY: [phone]   [facility] [phone] [address] [place] PHYSICAL & OCCUPATIONAL THERAPY: [phone]*   [place] [address] BILLING QUESTIONS: [phone]   (Tuesday & Thursday) FAX: [phone]       *Therapy locations that offer Saturday options: [place], [place], [place]    Thank you for choosing [facility]!    If you have any questions, please do not hesitate to reach out on BetterLessonhart or Call [phone] and ask for my team.    Jacob Trevizo MD, [facility]

## 2025-06-09 NOTE — PROGRESS NOTES
SPORTS MEDICINE CLINIC NEW PATIENT VISIT    REFERRAL SOURCE: Lavinia Peralta    PATIENT'S GOAL FOR APPOINTMENT: figure out what the problem is     HISTORY OF PRESENT ILLNESS  Kimberley Carrillo is a 54 year old Right-handed female presenting as a new patient with right shoulder pain    When did problem start?/Trauma associated with onset?:  Onset: 3-4 months, No NELIA     Location & description of pain:  Tip of right shoulder,   Weakness, achy    Exacerbating factors:   Working out, lifting, caring a bag; laying on it    Remitting factors:  Heat     Previous Treatments:  -Medication: Advil   -Rehabilitation: None   -Durable Medical Equipment: None   -Injections: None   -Modalities: Heat   -Other Providers seen: None     Sports, Hobbies, Employment:  Work in an office   Walking, playing tennis, lifting weight     Average hours of sleep per night: 7 hours; sleep not impacted but she does feel pain in middle of night    Average minutes of exercise per day: Daily 30+ minutes     Area of Problem  6/9/2025   Date 2 Date 3 Date 4 Date 5   Function Ability in last week - % of Baseline (0 is worst & 100 is best) 50%*       Sport/Activity Ability in last week - % of Baseline (0 is worst & 100 is best) <50%       Pain Level in the last week (0 is best & 10 is worst) 3-4        *daily impacting ex. Shower      Additional Information of consideration:  -Poor Balance? X   -Bowel/Bladder Incontinence? X   -Numbness/paresthesias in extremities? None     MEDICATIONS    Current Outpatient Medications:     albuterol (PROAIR HFA/PROVENTIL HFA/VENTOLIN HFA) 108 (90 Base) MCG/ACT inhaler, Inhale 2 puffs into the lungs every 6 hours as needed for shortness of breath, wheezing or cough., Disp: 18 g, Rfl: 3    betamethasone dipropionate (DIPROSONE) 0.05 % external cream, Apply topically daily., Disp: 45 g, Rfl: 5    estradiol (ESTRACE) 1 MG tablet, Take 0.5 tablets (0.5 mg) by mouth daily for 90 days, Disp: 90 tablet, Rfl: 0    fluticasone  (FLONASE) 50 MCG/ACT spray, PLACE 2 SPRAYS INTO BOTH NOSTRILS DAILY., Disp: , Rfl: 11    OMEPRAZOLE PO, Take 10 mg by mouth daily, Disp: , Rfl:     progesterone (PROMETRIUM) 100 MG capsule, Take 1 capsule (100 mg) by mouth daily, Disp: 90 capsule, Rfl: 3    semaglutide (OZEMPIC) 2 MG/1.5ML SOPN pen, Inject 2.4 mg subcutaneously every 7 days., Disp: , Rfl:     topiramate (TOPAMAX) 25 MG capsule, , Disp: , Rfl:     triamcinolone (KENALOG) 0.1 % external cream, Apply topically 2 times daily., Disp: 80 g, Rfl: 1    ALLERGIES  Allergies   Allergen Reactions    Erythromycin Nausea       PAST MEDICAL HISTORY  Past Medical History:   Diagnosis Date    Hypertension     Papanicolaou smear of cervix with low grade squamous intraepithelial lesion (LGSIL) 10/26/2011    10/26/11 LSIL/Neg HPV 4/12, 10/12, 4/13, 11/13, 11/14, 11/15, 11/16 NIL paps     Uncomplicated asthma        PAST SURGICAL HISTORY  Past Surgical History:   Procedure Laterality Date    ABDOMEN SURGERY      2 C sections    breast lift      BREAST SURGERY      Reduction    C/SECTION, CLASSICAL      X 2    COLONOSCOPY  2024    COLONOSCOPY WITH CO2 INSUFFLATION N/A 08/01/2024    Procedure: Colonoscopy with CO2 insufflation;  Surgeon: Marie Newton MD;  Location: [facility] OR    COSMETIC SURGERY  2008    SOFT TISSUE SURGERY  2023    Trigger finger       SOCIAL HISTORY  Social History     Tobacco Use    Smoking status: Never    Smokeless tobacco: Never   Vaping Use    Vaping status: Never Used   Substance Use Topics    Alcohol use: Yes     Comment: .occ    Drug use: Never       FAMILY HISTORY  Family History   Problem Relation Age of Onset    Hyperlipidemia Mother     Factor V Leiden deficiency Mother     Hypertension Mother     Hyperlipidemia Father     Hypertension Father     Factor V Leiden deficiency Sister     Hypothyroidism Sister     Factor V Leiden deficiency Brother     Osteoporosis Maternal Grandmother     Colon Cancer Maternal Grandfather     Depression  "Maternal Grandfather     Cerebrovascular Disease Paternal Grandfather        REVIEW OF SYSTEMS  Complete 12 system Review of Systems performed and was negative except for HPI.    VITALS  Estimated body mass index is 28.46 kg/m  as calculated from the following:    Height as of this encounter: 1.651 m (5' 5\").    Weight as of this encounter: 77.6 kg (171 lb).    PHYSICAL EXAMINATION   General: Age appropriate appearing, no acute distress  HEENT: normocephalic, atraumatic, sclera non-icteric  Skin: No open skin lesion noted in visible areas.  Respiratory: Non labored breathing, No wheezes.  Cardiac/Vessels: No edema, cyanosis, clubbing noted in all extremities.  Lymph: No palpable lymph node swelling noted around the affected area.  Mental: There was no signs of aberrant behaviors noted. Patient was pleasant throughout the encounter.    Functional Movements: Non-antalgic gait appreciated.      SHOULDER:   Inspection: No notable asymmetry appreciated upon exam bilaterally  Palpation: TTP lateral shoulder along supraspinatus footprint and mild TTP biceps tendon  Range of Motion (Estimated, Active unless otherwise noted, L/R):   Flexion (normal = 170-180 degrees): normal/160*pain limited  Abduction (normal = 170-180 degrees): normal/normal  External Rotation (normal = 90 degrees): normal/normal  Internal Rotation: Reaching hands behind back, with right thumb reaches back pocket & left thumb reached C7    Special Testing:   Neers (+)  Empty Can (+)  Kelley (+)    IMAGING STUDIES:  6/9/2025 Right shoulder x-ray: mild AC joint OA and tiny ossification at insertion site of supraspinatus consistent with supraspinatus calcific tendonitis.    I personally reviewed these images and agree with the radiology report and shared the findings with the patient.    IMPRESSION  Kimberley Carrillo is a very pleasant 54 year old right-hand-dominant female  who enjoys tennis, weight lifting and walking and is presenting with " right lateral shoulder pain of 3 months duration without any traumatic onset.  Clinical findings reveal positive supraspinatus tendon and impingement testing with radiographic findings of tiny supraspinatus calcific tendonitis with mild AC joint arthritis.  Clinical picture is most consistent with supraspinatus calcific tenonitis with rotator cuff (supraspinatus) impingement syndrome.    PLAN  The following was discussed with the patient:  Activity Modification: Activity as tolerated, discussed that there is mixed evidence that  [ ] Supplements   -Mixed evidence that Glucosamine and Chondroitin Sulfate, Tumeric and Hydrolyzed Collagen are beneficial  Imaging/Tests: X-rays reviewed as noted above  Rehabilitation: Physical therapy recommended, referral placed  Medication:Non-prescription topical and/or oral analgesics may be used as needed   Interventions: None at this time, consider corticosteroid injection if symptoms persist  Follow-up Plan: As needed after 2-3 physical therapy sessions if symptoms persist  Resources Provided: Written and verbal information detailing above findings and plan provided including after visit summary.    They were encouraged to message me on Obsorb whenever they needed.    The patient was in agreement with this plan. All questions were answered to the best of my ability.    Total time (face-to-face and non-face-to-face) spent on today's visit was 45 minutes. This included preparation for the visit (i.e. reviewing test results), performance of a medically appropriate history and examination, placing orders for medications, tests or other procedures, and discussing the plan of care with the patient. This time is exclusive of procedures performed and time spent teaching.     Jacob Trevizo MD, [facility]  Sports Medicine Attending Physician  Department of Physical Medicine & Rehabilitation

## 2025-06-09 NOTE — LETTER
6/9/2025      Kimberley Carrillo  [address] [address] [place], [place] [id number]      Dear Colleague,    Thank you for referring your patient, Kimberley Carrillo, to the [facility] [place]. Please see a copy of my visit note below.    SPORTS MEDICINE CLINIC NEW PATIENT VISIT    REFERRAL SOURCE: Lavinia Peralta    PATIENT'S GOAL FOR APPOINTMENT: figure out what the problem is     HISTORY OF PRESENT ILLNESS  Kimberley Carrillo is a 54 year old Right-handed female presenting as a new patient with right shoulder pain    When did problem start?/Trauma associated with onset?:  Onset: 3-4 months, No NELIA     Location & description of pain:  Tip of right shoulder,   Weakness, achy    Exacerbating factors:   Working out, lifting, caring a bag; laying on it    Remitting factors:  Heat     Previous Treatments:  -Medication: Advil   -Rehabilitation: None   -Durable Medical Equipment: None   -Injections: None   -Modalities: Heat   -Other Providers seen: None     Sports, Hobbies, Employment:  Work in an office   Walking, playing tennis, lifting weight     Average hours of sleep per night: 7 hours; sleep not impacted but she does feel pain in middle of night    Average minutes of exercise per day: Daily 30+ minutes     Area of Problem  6/9/2025   Date 2 Date 3 Date 4 Date 5   Function Ability in last week - % of Baseline (0 is worst & 100 is best) 50%*       Sport/Activity Ability in last week - % of Baseline (0 is worst & 100 is best) <50%       Pain Level in the last week (0 is best & 10 is worst) 3-4        *daily impacting ex. Shower      Additional Information of consideration:  -Poor Balance? X   -Bowel/Bladder Incontinence? X   -Numbness/paresthesias in extremities? None     MEDICATIONS    Current Outpatient Medications:      albuterol (PROAIR HFA/PROVENTIL HFA/VENTOLIN HFA) 108 (90 Base) MCG/ACT inhaler, Inhale 2 puffs into the lungs every 6 hours as needed for shortness of breath, wheezing or cough., Disp: 18 g,  Rfl: 3     betamethasone dipropionate (DIPROSONE) 0.05 % external cream, Apply topically daily., Disp: 45 g, Rfl: 5     estradiol (ESTRACE) 1 MG tablet, Take 0.5 tablets (0.5 mg) by mouth daily for 90 days, Disp: 90 tablet, Rfl: 0     fluticasone (FLONASE) 50 MCG/ACT spray, PLACE 2 SPRAYS INTO BOTH NOSTRILS DAILY., Disp: , Rfl: 11     OMEPRAZOLE PO, Take 10 mg by mouth daily, Disp: , Rfl:      progesterone (PROMETRIUM) 100 MG capsule, Take 1 capsule (100 mg) by mouth daily, Disp: 90 capsule, Rfl: 3     semaglutide (OZEMPIC) 2 MG/1.5ML SOPN pen, Inject 2.4 mg subcutaneously every 7 days., Disp: , Rfl:      topiramate (TOPAMAX) 25 MG capsule, , Disp: , Rfl:      triamcinolone (KENALOG) 0.1 % external cream, Apply topically 2 times daily., Disp: 80 g, Rfl: 1    ALLERGIES  Allergies   Allergen Reactions     Erythromycin Nausea       PAST MEDICAL HISTORY  Past Medical History:   Diagnosis Date     Hypertension      Papanicolaou smear of cervix with low grade squamous intraepithelial lesion (LGSIL) 10/26/2011    10/26/11 LSIL/Neg HPV 4/12, 10/12, 4/13, 11/13, 11/14, 11/15, 11/16 NIL paps      Uncomplicated asthma        PAST SURGICAL HISTORY  Past Surgical History:   Procedure Laterality Date     ABDOMEN SURGERY      2 C sections     breast lift       BREAST SURGERY      Reduction     C/SECTION, CLASSICAL      X 2     COLONOSCOPY  2024     COLONOSCOPY WITH CO2 INSUFFLATION N/A 08/01/2024    Procedure: Colonoscopy with CO2 insufflation;  Surgeon: Marie Newton MD;  Location: [facility] OR     COSMETIC SURGERY  2008     SOFT TISSUE SURGERY  2023    Trigger finger       SOCIAL HISTORY  Social History     Tobacco Use     Smoking status: Never     Smokeless tobacco: Never   Vaping Use     Vaping status: Never Used   Substance Use Topics     Alcohol use: Yes     Comment: .occ     Drug use: Never       FAMILY HISTORY  Family History   Problem Relation Age of Onset     Hyperlipidemia Mother      Factor V Leiden deficiency Mother   "    Hypertension Mother      Hyperlipidemia Father      Hypertension Father      Factor V Leiden deficiency Sister      Hypothyroidism Sister      Factor V Leiden deficiency Brother      Osteoporosis Maternal Grandmother      Colon Cancer Maternal Grandfather      Depression Maternal Grandfather      Cerebrovascular Disease Paternal Grandfather        REVIEW OF SYSTEMS  Complete 12 system Review of Systems performed and was negative except for HPI.    VITALS  Estimated body mass index is 28.46 kg/m  as calculated from the following:    Height as of this encounter: 1.651 m (5' 5\").    Weight as of this encounter: 77.6 kg (171 lb).    PHYSICAL EXAMINATION   General: Age appropriate appearing, no acute distress  HEENT: normocephalic, atraumatic, sclera non-icteric  Skin: No open skin lesion noted in visible areas.  Respiratory: Non labored breathing, No wheezes.  Cardiac/Vessels: No edema, cyanosis, clubbing noted in all extremities.  Lymph: No palpable lymph node swelling noted around the affected area.  Mental: There was no signs of aberrant behaviors noted. Patient was pleasant throughout the encounter.    Functional Movements: Non-antalgic gait appreciated.      SHOULDER:   Inspection: No notable asymmetry appreciated upon exam bilaterally  Palpation: TTP lateral shoulder along supraspinatus footprint and mild TTP biceps tendon  Range of Motion (Estimated, Active unless otherwise noted, L/R):   Flexion (normal = 170-180 degrees): normal/160*pain limited  Abduction (normal = 170-180 degrees): normal/normal  External Rotation (normal = 90 degrees): normal/normal  Internal Rotation: Reaching hands behind back, with right thumb reaches back pocket & left thumb reached C7    Special Testing:   Neers (+)  Empty Can (+)  Kelley (+)    IMAGING STUDIES:  6/9/2025 Right shoulder x-ray: mild AC joint OA and tiny ossification at insertion site of supraspinatus consistent with supraspinatus calcific tendonitis.    I personally " reviewed these images and agree with the radiology report and shared the findings with the patient.    IMPRESSION  Kimberley Carrillo is a very pleasant 54 year old right-hand-dominant female  who enjoys tennis, weight lifting and walking and is presenting with right lateral shoulder pain of 3 months duration without any traumatic onset.  Clinical findings reveal positive supraspinatus tendon and impingement testing with radiographic findings of tiny supraspinatus calcific tendonitis with mild AC joint arthritis.  Clinical picture is most consistent with supraspinatus calcific tenonitis with rotator cuff (supraspinatus) impingement syndrome.    PLAN  The following was discussed with the patient:  Activity Modification: Activity as tolerated, discussed that there is mixed evidence that  [ ] Supplements   -Mixed evidence that Glucosamine and Chondroitin Sulfate, Tumeric and Hydrolyzed Collagen are beneficial  Imaging/Tests: X-rays reviewed as noted above  Rehabilitation: Physical therapy recommended, referral placed  Medication:Non-prescription topical and/or oral analgesics may be used as needed   Interventions: None at this time, consider corticosteroid injection if symptoms persist  Follow-up Plan: As needed after 2-3 physical therapy sessions if symptoms persist  Resources Provided: Written and verbal information detailing above findings and plan provided including after visit summary.    They were encouraged to message me on Hadapt whenever they needed.    The patient was in agreement with this plan. All questions were answered to the best of my ability.    Total time (face-to-face and non-face-to-face) spent on today's visit was 45 minutes. This included preparation for the visit (i.e. reviewing test results), performance of a medically appropriate history and examination, placing orders for medications, tests or other procedures, and discussing the plan of care with the patient. This  time is exclusive of procedures performed and time spent teaching.     Jacob Trevizo MD, [facility]  Sports Medicine Attending Physician  Department of Physical Medicine & Rehabilitation     Again, thank you for allowing me to participate in the care of your patient.        Sincerely,        Jacob Trevizo MD    Electronically signed

## 2025-06-12 ENCOUNTER — APPOINTMENT (OUTPATIENT)
Dept: URBAN - METROPOLITAN AREA CLINIC 259 | Age: 55
Setting detail: DERMATOLOGY
End: 2025-06-12

## 2025-06-12 VITALS — WEIGHT: 163 LBS | HEIGHT: 66 IN

## 2025-06-12 DIAGNOSIS — T300 ERYTHEMA [FIRST DEGREE], UNSPECIFIED SITE: ICD-10-CM

## 2025-06-12 PROBLEM — T23.102A BURN OF FIRST DEGREE OF LEFT HAND, UNSPECIFIED SITE, INITIAL ENCOUNTER: Status: ACTIVE | Noted: 2025-06-12

## 2025-06-12 PROCEDURE — 99212 OFFICE O/P EST SF 10 MIN: CPT

## 2025-06-12 PROCEDURE — OTHER COUNSELING: OTHER

## 2025-06-12 ASSESSMENT — LOCATION DETAILED DESCRIPTION DERM: LOCATION DETAILED: LEFT ULNAR DORSAL HAND

## 2025-06-12 ASSESSMENT — LOCATION SIMPLE DESCRIPTION DERM: LOCATION SIMPLE: LEFT HAND

## 2025-06-12 ASSESSMENT — LOCATION ZONE DERM: LOCATION ZONE: HAND

## 2025-07-05 ASSESSMENT — ACTIVITIES OF DAILY LIVING (ADL)
TOUCHING_THE_BACK_OF_YOUR_NECK: 0
AT_ITS_WORST?: 4
REMOVING_SOMETHING_FROM_YOUR_BACK_POCKET: 0
REACHING_FOR_SOMETHING_ON_A_HIGH_SHELF: 2
PLACING_AN_OBJECT_ON_A_HIGH_SHELF: 2
WASHING_YOUR_HAIR?: 0
PLEASE_INDICATE_YOR_PRIMARY_REASON_FOR_REFERRAL_TO_THERAPY:: SHOULDER
PUSHING_WITH_THE_INVOLVED_ARM: 0
CARRYING_A_HEAVY_OBJECT_OF_10_POUNDS: 0
PUTTING_ON_AN_UNDERSHIRT_OR_A_PULLOVER_SWEATER: 2
WHEN_LYING_ON_THE_INVOLVED_SIDE: 3
WASHING_YOUR_BACK: 3
PUTTING_ON_A_SHIRT_THAT_BUTTONS_DOWN_THE_FRONT: 0
PUTTING_ON_YOUR_PANTS: 0

## 2025-07-09 ENCOUNTER — MYC MEDICAL ADVICE (OUTPATIENT)
Dept: OBGYN | Facility: CLINIC | Age: 55
End: 2025-07-09

## 2025-07-09 ENCOUNTER — THERAPY VISIT (OUTPATIENT)
Dept: PHYSICAL THERAPY | Facility: CLINIC | Age: 55
End: 2025-07-09
Payer: COMMERCIAL

## 2025-07-09 DIAGNOSIS — N95.1 VASOMOTOR SYMPTOMS DUE TO MENOPAUSE: ICD-10-CM

## 2025-07-09 DIAGNOSIS — M75.30 CALCIFIC SUPRASPINATUS TENDONITIS: ICD-10-CM

## 2025-07-09 DIAGNOSIS — M25.511 CHRONIC RIGHT SHOULDER PAIN: ICD-10-CM

## 2025-07-09 DIAGNOSIS — G89.29 CHRONIC RIGHT SHOULDER PAIN: ICD-10-CM

## 2025-07-09 DIAGNOSIS — M75.41 SUBACROMIAL IMPINGEMENT OF RIGHT SHOULDER: ICD-10-CM

## 2025-07-09 PROCEDURE — 97161 PT EVAL LOW COMPLEX 20 MIN: CPT | Mod: GP | Performed by: PHYSICAL THERAPIST

## 2025-07-09 PROCEDURE — 97140 MANUAL THERAPY 1/> REGIONS: CPT | Mod: GP | Performed by: PHYSICAL THERAPIST

## 2025-07-09 PROCEDURE — 97110 THERAPEUTIC EXERCISES: CPT | Mod: GP | Performed by: PHYSICAL THERAPIST

## 2025-07-09 RX ORDER — PROGESTERONE 100 MG/1
100 CAPSULE ORAL DAILY
Qty: 90 CAPSULE | Refills: 3 | OUTPATIENT
Start: 2025-07-09

## 2025-07-09 NOTE — TELEPHONE ENCOUNTER
Requested Prescriptions   Pending Prescriptions Disp Refills    progesterone (PROMETRIUM) 100 MG capsule 90 capsule 3     Sig: Take 1 capsule (100 mg) by mouth daily.       Hormone Replacement Therapy Failed - 7/9/2025  3:06 PM        Failed - Recent (12 month) or future (90 days) visit with authorizing provider's specialty (provided they have been seen in the past 15 months)     The patient must have completed an in-person or virtual visit within the past 12 months or has a future visit scheduled within the next 90 days with the authorizing provider s specialty.  Urgent care and e-visits do not qualify as an office visit for this protocol.          Passed - Most recent blood pressure under 140/90 in past 12 months     BP Readings from Last 3 Encounters:   05/28/25 119/80   08/01/24 126/74   05/03/24 132/68       No data recorded            Passed - Medication is active on med list and the sig matches. RN to manually verify dose and sig if red X/fail.     If the protocol passes (green check), you do not need to verify med dose and sig.    A prescription matches if they are the same clinical intention.    For Example: once daily and every morning are the same.    The protocol can not identify upper and lower case letters as matching and will fail.     For Example: Take 1 tablet (50 mg) by mouth daily     TAKE 1 TABLET (50 MG) BY MOUTH DAILY    For all fails (red x), verify dose and sig.    If the refill does match what is on file, the RN can still proceed to approve the refill request.       If they do not match, route to the appropriate provider.             Passed - Medication indicated for associated diagnosis     The medication is prescribed for one or more of the following conditions:    Menopause   Vulva/Vaginal atrophy   Low Estrogen   Gender Dysphoria   Male to Female transgender          Passed - Patient is 18 years of age or older        Passed - No active pregnancy on record        Passed - No positive  pregnancy test on record in past 12 months              Last Written Prescription Date:  7 apr 2025  Last Fill Quantity: 90,  # refills: 3   Last office visit: 5/3/2024 ;

## 2025-07-09 NOTE — TELEPHONE ENCOUNTER
Pt needs appt for further refills   Rx denied    Sent pt [employer] message to schedule appt    Gabriela Weiner RN on 7/9/2025 at 3:30 PM  [facility] Triage

## 2025-07-09 NOTE — PROGRESS NOTES
PHYSICAL THERAPY EVALUATION  Type of Visit: Evaluation       Fall Risk Screen:  Have you fallen 2 or more times in the past year?: No  Have you fallen and had an injury in the past year?: No    Subjective         Presenting condition or subjective complaint: Shoulder Pain-symptoms began ~4 months, insidious onset  Date of onset: 06/09/25    Relevant medical history: Asthma; Menopause; Overweight   Dates & types of surgery: 2 C Sections    Prior diagnostic imaging/testing results: X-ray     Prior therapy history for the same diagnosis, illness or injury: No          Living Environment  Social support: With family members   Type of home: House   Stairs to enter the home: Yes 2 Is there a railing: No     Ramp: No   Stairs inside the home: Yes 13 Is there a railing: Yes     Help at home: None  Equipment owned:       Employment: Yes Executive  Hobbies/Interests: Travel, boating, tennis, games, spending time with family and friends    Patient goals for therapy: Strengthen to remove pain         Objective   SHOULDER EVALUATION  PAIN: Pain Level at Rest: 0/10  Pain Level with Use: 3/10  Pain Location: A/P R shoulder, inside joint  Pain Quality: Dull, Miserable, Nagging, Penetrating, Pressure, Tender, and Tiring  Pain Frequency: intermittent  Pain is Worst: activity dependent  Pain is Exacerbated By: reaching overhead, reaching behind back, laying on R side,   Pain is Relieved By: heat, otc medications, and rest  Pain Progression: Improved  INTEGUMENTARY (edema, incisions): WNL  POSTURE: Sitting Posture: Rounded shoulders, Forward head      WEIGHTBEARING ALIGNMENT: Shoulder/UE WB Alignment:Rounded shoulders, Forward head, Humeral head anterior R, Scapular winging R  ROM:   (Degrees) Left AROM Left PROM Right AROM  Right PROM   Shoulder Flexion 180  128  180   Shoulder Extension       Shoulder Abduction 170  100 160   Shoulder Adduction       Shoulder Internal Rotation       Shoulder External Rotation 90  66 80   Shoulder  Horizontal Abduction       Shoulder Horizontal Adduction       Shoulder Flexion ER       Shoulder Flexion IR To T5  To T9    Elbow Extension       Elbow Flexion           STRENGTH:   Pain: - none + mild ++ moderate +++ severe  Strength Scale: 0-5/5 Left Right   Shoulder Flexion 5 5, + (mild)   Shoulder Extension     Shoulder Abduction 5 4-, ++ (mod)   Shoulder Adduction     Shoulder Internal Rotation 5 5, + (mild)   Shoulder External Rotation 5 5, + (mild)   Shoulder Horizontal Abduction     Shoulder Horizontal Adduction     Elbow Flexion 5 5   Elbow Extension 5 5   Mid Trap 4- 4-   Lower Trap 3 3-   Rhomboid 4- 4-   Serratus Anterior       FLEXIBILITY: R shoulder-subscapularis  and pect tightness  SPECIAL TESTS: WFL  PALPATION: Subscapularis tendenress  JOINT MOBILITY:    Left Right   Glenohumeral anterior     Glenohumeral posterior WNL Hypomobile   Glenohumeral inferior WNL Hypomobile   Acromioclavicular     Scapulothoracic WNL Hypomobile   Sternoclavicular     Scapulohumeral rhythm           Assessment & Plan   CLINICAL IMPRESSIONS  Medical Diagnosis: Chronic R shoulder pain, Subacromial impingement of right shoulder, Calcific supraspinatus tendonitis    Treatment Diagnosis: Chronic R shoulder pain, Subacromial impingement of right shoulder, Calcific supraspinatus tendonitis   Impression/Assessment: Patient is a 54 year old female with R shoulder complaints.  The following significant findings have been identified: Pain, Decreased ROM/flexibility, Decreased joint mobility, Decreased strength, and Decreased activity tolerance. These impairments interfere with their ability to perform self care tasks and household chores as compared to previous level of function.     Clinical Decision Making (Complexity):  Clinical Presentation: Stable/Uncomplicated  Clinical Presentation Rationale: based on medical and personal factors listed in PT evaluation  Clinical Decision Making (Complexity): Low complexity    PLAN OF  CARE  Treatment Interventions:  Interventions: Manual Therapy, Neuromuscular Re-education, Therapeutic Activity, Therapeutic Exercise, Self-Care/Home Management    Long Term Goals     PT Goal 1  Goal Identifier: Reaching  Goal Description: Able to reach overhead(160+ dgerees) and/or abduction(160+) with 0-2/10 PL  Rationale: to maximize safety and independence with self cares;to maximize safety and independence with performance of ADLs and functional tasks  Goal Progress: R shoulder flexion 128 and abduction 100 with PL up to 5/10  Target Date: 10/01/25      Frequency of Treatment: 1x/week everyother week  Duration of Treatment: 12 weeks    Recommended Referrals to Other Professionals: Physical Therapy  Education Assessment:   Learner/Method: Patient;Listening;Reading;Demonstration;Pictures/Video;No Barriers to Learning    Risks and benefits of evaluation/treatment have been explained.   Patient/Family/caregiver agrees with Plan of Care.     Evaluation Time:     PT Eval, Low Complexity Minutes (55643): 15       Signing Clinician: Daisy Suh PT             Fall

## 2025-07-10 RX ORDER — ESTRADIOL 1 MG/1
0.5 TABLET ORAL DAILY
Qty: 90 TABLET | Refills: 0 | Status: SHIPPED | OUTPATIENT
Start: 2025-07-10

## 2025-07-10 RX ORDER — PROGESTERONE 100 MG/1
100 CAPSULE ORAL DAILY
Qty: 90 CAPSULE | Refills: 3 | Status: SHIPPED | OUTPATIENT
Start: 2025-07-10

## (undated) DEVICE — KIT ENDO FIRST STEP DISINFECTANT 200ML W/POUCH EP-4

## (undated) DEVICE — PREP CHLORAPREP 26ML TINTED ORANGE  260815

## (undated) DEVICE — PAD CHUX UNDERPAD 23X24" 7136

## (undated) RX ORDER — FENTANYL CITRATE 50 UG/ML
INJECTION, SOLUTION INTRAMUSCULAR; INTRAVENOUS
Status: DISPENSED
Start: 2024-08-01